# Patient Record
Sex: MALE | Race: WHITE | HISPANIC OR LATINO | Employment: FULL TIME | URBAN - METROPOLITAN AREA
[De-identification: names, ages, dates, MRNs, and addresses within clinical notes are randomized per-mention and may not be internally consistent; named-entity substitution may affect disease eponyms.]

---

## 2022-08-22 ENCOUNTER — HOSPITAL ENCOUNTER (EMERGENCY)
Facility: HOSPITAL | Age: 42
Discharge: HOME | End: 2022-08-22
Attending: EMERGENCY MEDICINE
Payer: COMMERCIAL

## 2022-08-22 ENCOUNTER — APPOINTMENT (OUTPATIENT)
Dept: RADIOLOGY | Facility: HOSPITAL | Age: 42
End: 2022-08-22
Payer: COMMERCIAL

## 2022-08-22 VITALS
HEIGHT: 73 IN | OXYGEN SATURATION: 100 % | BODY MASS INDEX: 35.65 KG/M2 | SYSTOLIC BLOOD PRESSURE: 178 MMHG | RESPIRATION RATE: 18 BRPM | WEIGHT: 269 LBS | TEMPERATURE: 98.1 F | HEART RATE: 87 BPM | DIASTOLIC BLOOD PRESSURE: 88 MMHG

## 2022-08-22 DIAGNOSIS — R53.83 FATIGUE: ICD-10-CM

## 2022-08-22 DIAGNOSIS — R42 LIGHTHEADEDNESS: Primary | ICD-10-CM

## 2022-08-22 DIAGNOSIS — E11.9 DIABETES MELLITUS (HCC): ICD-10-CM

## 2022-08-22 LAB
BASE EXCESS BLDA CALC-SCNC: 4 MMOL/L (ref -2–3)
BASOPHILS # BLD AUTO: 0.06 THOUSANDS/ΜL (ref 0–0.1)
BASOPHILS NFR BLD AUTO: 1 % (ref 0–1)
CA-I BLD-SCNC: 1.17 MMOL/L (ref 1.12–1.32)
CARDIAC TROPONIN I PNL SERPL HS: 3 NG/L
EOSINOPHIL # BLD AUTO: 0.56 THOUSAND/ΜL (ref 0–0.61)
EOSINOPHIL NFR BLD AUTO: 5 % (ref 0–6)
ERYTHROCYTE [DISTWIDTH] IN BLOOD BY AUTOMATED COUNT: 14.4 % (ref 11.6–15.1)
GLUCOSE SERPL-MCNC: 150 MG/DL (ref 65–140)
GLUCOSE SERPL-MCNC: 164 MG/DL (ref 65–140)
HCO3 BLDA-SCNC: 28.9 MMOL/L (ref 24–30)
HCT VFR BLD AUTO: 47.3 % (ref 36.5–49.3)
HCT VFR BLD CALC: 47 % (ref 36.5–49.3)
HGB BLD-MCNC: 16.2 G/DL (ref 12–17)
HGB BLDA-MCNC: 16 G/DL (ref 12–17)
IMM GRANULOCYTES # BLD AUTO: 0.06 THOUSAND/UL (ref 0–0.2)
IMM GRANULOCYTES NFR BLD AUTO: 1 % (ref 0–2)
LYMPHOCYTES # BLD AUTO: 3.6 THOUSANDS/ΜL (ref 0.6–4.47)
LYMPHOCYTES NFR BLD AUTO: 31 % (ref 14–44)
MCH RBC QN AUTO: 28.6 PG (ref 26.8–34.3)
MCHC RBC AUTO-ENTMCNC: 34.2 G/DL (ref 31.4–37.4)
MCV RBC AUTO: 83 FL (ref 82–98)
MONOCYTES # BLD AUTO: 0.69 THOUSAND/ΜL (ref 0.17–1.22)
MONOCYTES NFR BLD AUTO: 6 % (ref 4–12)
NEUTROPHILS # BLD AUTO: 6.6 THOUSANDS/ΜL (ref 1.85–7.62)
NEUTS SEG NFR BLD AUTO: 56 % (ref 43–75)
NRBC BLD AUTO-RTO: 0 /100 WBCS
PCO2 BLD: 30 MMOL/L (ref 21–32)
PCO2 BLD: 45 MM HG (ref 42–50)
PH BLD: 7.42 [PH] (ref 7.3–7.4)
PLATELET # BLD AUTO: 337 THOUSANDS/UL (ref 149–390)
PMV BLD AUTO: 9.5 FL (ref 8.9–12.7)
PO2 BLD: 39 MM HG (ref 35–45)
POTASSIUM BLD-SCNC: 4.6 MMOL/L (ref 3.5–5.3)
RBC # BLD AUTO: 5.67 MILLION/UL (ref 3.88–5.62)
SAO2 % BLD FROM PO2: 73 % (ref 60–85)
SODIUM BLD-SCNC: 138 MMOL/L (ref 136–145)
SPECIMEN SOURCE: ABNORMAL
WBC # BLD AUTO: 11.57 THOUSAND/UL (ref 4.31–10.16)

## 2022-08-22 PROCEDURE — 99285 EMERGENCY DEPT VISIT HI MDM: CPT | Performed by: EMERGENCY MEDICINE

## 2022-08-22 PROCEDURE — 82947 ASSAY GLUCOSE BLOOD QUANT: CPT

## 2022-08-22 PROCEDURE — 84132 ASSAY OF SERUM POTASSIUM: CPT

## 2022-08-22 PROCEDURE — 82330 ASSAY OF CALCIUM: CPT

## 2022-08-22 PROCEDURE — 93005 ELECTROCARDIOGRAM TRACING: CPT

## 2022-08-22 PROCEDURE — 85025 COMPLETE CBC W/AUTO DIFF WBC: CPT | Performed by: EMERGENCY MEDICINE

## 2022-08-22 PROCEDURE — 71046 X-RAY EXAM CHEST 2 VIEWS: CPT

## 2022-08-22 PROCEDURE — 82948 REAGENT STRIP/BLOOD GLUCOSE: CPT

## 2022-08-22 PROCEDURE — 99284 EMERGENCY DEPT VISIT MOD MDM: CPT

## 2022-08-22 PROCEDURE — 83036 HEMOGLOBIN GLYCOSYLATED A1C: CPT | Performed by: EMERGENCY MEDICINE

## 2022-08-22 PROCEDURE — 85014 HEMATOCRIT: CPT

## 2022-08-22 PROCEDURE — 84295 ASSAY OF SERUM SODIUM: CPT

## 2022-08-22 PROCEDURE — 83690 ASSAY OF LIPASE: CPT | Performed by: EMERGENCY MEDICINE

## 2022-08-22 PROCEDURE — 82803 BLOOD GASES ANY COMBINATION: CPT

## 2022-08-22 PROCEDURE — 36415 COLL VENOUS BLD VENIPUNCTURE: CPT

## 2022-08-22 PROCEDURE — 84484 ASSAY OF TROPONIN QUANT: CPT | Performed by: EMERGENCY MEDICINE

## 2022-08-22 PROCEDURE — 80053 COMPREHEN METABOLIC PANEL: CPT | Performed by: EMERGENCY MEDICINE

## 2022-08-22 PROCEDURE — 96360 HYDRATION IV INFUSION INIT: CPT

## 2022-08-22 PROCEDURE — 80061 LIPID PANEL: CPT | Performed by: EMERGENCY MEDICINE

## 2022-08-22 RX ADMIN — SODIUM CHLORIDE 1000 ML: 0.9 INJECTION, SOLUTION INTRAVENOUS at 22:41

## 2022-08-22 NOTE — Clinical Note
Adolfo Mantilla was seen and treated in our emergency department on 8/22/2022  No restrictions            Diagnosis:     Lisa Friend  may return to work on return date  He may return on this date: 08/24/2022         If you have any questions or concerns, please don't hesitate to call        Fracisco Tillman, DO    ______________________________           _______________          _______________  Hospital Representative                              Date                                Time

## 2022-08-23 LAB
ALBUMIN SERPL BCP-MCNC: 4.2 G/DL (ref 3.5–5)
ALP SERPL-CCNC: 104 U/L (ref 46–116)
ALT SERPL W P-5'-P-CCNC: 44 U/L (ref 12–78)
ANION GAP SERPL CALCULATED.3IONS-SCNC: 7 MMOL/L (ref 4–13)
AST SERPL W P-5'-P-CCNC: 29 U/L (ref 5–45)
ATRIAL RATE: 87 BPM
BILIRUB SERPL-MCNC: 0.42 MG/DL (ref 0.2–1)
BUN SERPL-MCNC: 17 MG/DL (ref 5–25)
CALCIUM SERPL-MCNC: 8.4 MG/DL (ref 8.3–10.1)
CHLORIDE SERPL-SCNC: 105 MMOL/L (ref 96–108)
CHOLEST SERPL-MCNC: 250 MG/DL
CO2 SERPL-SCNC: 22 MMOL/L (ref 21–32)
CREAT SERPL-MCNC: 0.92 MG/DL (ref 0.6–1.3)
EST. AVERAGE GLUCOSE BLD GHB EST-MCNC: 174 MG/DL
GFR SERPL CREATININE-BSD FRML MDRD: 103 ML/MIN/1.73SQ M
GLUCOSE SERPL-MCNC: 169 MG/DL (ref 65–140)
HBA1C MFR BLD: 7.7 %
HDLC SERPL-MCNC: 28 MG/DL
LIPASE SERPL-CCNC: 132 U/L (ref 73–393)
NONHDLC SERPL-MCNC: 222 MG/DL
P AXIS: 50 DEGREES
POTASSIUM SERPL-SCNC: 4 MMOL/L (ref 3.5–5.3)
PR INTERVAL: 152 MS
PROT SERPL-MCNC: 8.4 G/DL (ref 6.4–8.4)
QRS AXIS: 14 DEGREES
QRSD INTERVAL: 120 MS
QT INTERVAL: 366 MS
QTC INTERVAL: 440 MS
SODIUM SERPL-SCNC: 134 MMOL/L (ref 135–147)
T WAVE AXIS: 49 DEGREES
TRIGL SERPL-MCNC: 1282 MG/DL
VENTRICULAR RATE: 87 BPM

## 2022-08-23 PROCEDURE — 93010 ELECTROCARDIOGRAM REPORT: CPT | Performed by: INTERNAL MEDICINE

## 2022-08-23 NOTE — ED PROVIDER NOTES
History  Chief Complaint   Patient presents with    Dizziness     Pt is a new diabetic  Pt presents with dizziness starting 8/21  Vomiting yesterday, none today  27-year-old male with recent diagnosis of diabetes though not on any medications presents for evaluation of lightheadedness, fatigue, palpitations since yesterday  Has an appointment with a primary care provider at 11:00  Was told that his hemoglobin A1c was high on during an appointment in March though at that time he saw the [facility] group was out of network for him so he never followed up  On chart review it appears that his hemoglobin A1c was 8 8 in March  Currently denies any specific complaints such as chest pain abdominal pain fevers cough, urinary symptoms  Did have some nausea and 1 episode of nonbilious nonbloody vomitus yesterday  Had some intermittent palpitations and flashes in his vision currently without any complaints  Describes the lightheadedness though no vertigo sensation, no numbness or weakness of the extremities  Reports increased thirst           Prior to Admission Medications   Prescriptions Last Dose Informant Patient Reported? Taking?   naproxen (EC NAPROSYN) 500 MG EC tablet   No No   Sig: Take 1 tablet (500 mg total) by mouth 2 (two) times a day with meals for 7 days      Facility-Administered Medications: None       Past Medical History:   Diagnosis Date    Prediabetes        History reviewed  No pertinent surgical history  History reviewed  No pertinent family history  I have reviewed and agree with the history as documented      E-Cigarette/Vaping    E-Cigarette Use Former User      E-Cigarette/Vaping Substances     Social History     Tobacco Use    Smoking status: Current Every Day Smoker     Packs/day: 1 00    Smokeless tobacco: Never Used   Vaping Use    Vaping Use: Former   Substance Use Topics    Alcohol use: Never    Drug use: Never       Review of Systems   Constitutional: Positive for chills and fatigue  Negative for appetite change and fever  HENT: Negative for rhinorrhea and sore throat  Eyes: Negative for photophobia and visual disturbance  Respiratory: Negative for cough and shortness of breath  Cardiovascular: Positive for palpitations  Negative for chest pain  Gastrointestinal: Negative for abdominal pain and diarrhea  Genitourinary: Negative for dysuria, frequency and urgency  Skin: Negative for rash  Neurological: Positive for light-headedness  Negative for dizziness and weakness  All other systems reviewed and are negative  Physical Exam  Physical Exam  Vitals and nursing note reviewed  Constitutional:       Appearance: He is well-developed  HENT:      Head: Normocephalic and atraumatic  Right Ear: External ear normal       Left Ear: External ear normal       Mouth/Throat:      Mouth: Mucous membranes are moist    Eyes:      Conjunctiva/sclera: Conjunctivae normal       Pupils: Pupils are equal, round, and reactive to light  Neck:      Vascular: No JVD  Trachea: No tracheal deviation  Cardiovascular:      Rate and Rhythm: Normal rate and regular rhythm  Heart sounds: Normal heart sounds  No murmur heard  No friction rub  No gallop  Pulmonary:      Effort: Pulmonary effort is normal  No respiratory distress  Breath sounds: No stridor  No wheezing or rales  Abdominal:      General: There is no distension  Palpations: Abdomen is soft  There is no mass  Tenderness: There is no abdominal tenderness  There is no guarding or rebound  Musculoskeletal:         General: Normal range of motion  Cervical back: Normal range of motion and neck supple  Skin:     General: Skin is warm and dry  Capillary Refill: Capillary refill takes less than 2 seconds  Coloration: Skin is not pale  Findings: No erythema or rash  Neurological:      General: No focal deficit present        Mental Status: He is alert and oriented to person, place, and time  Mental status is at baseline  Cranial Nerves: No cranial nerve deficit  Vital Signs  ED Triage Vitals [08/22/22 2113]   Temperature Pulse Respirations Blood Pressure SpO2   98 1 °F (36 7 °C) 87 18 (!) 178/88 100 %      Temp Source Heart Rate Source Patient Position - Orthostatic VS BP Location FiO2 (%)   Temporal Monitor Sitting Right arm --      Pain Score       No Pain           Vitals:    08/22/22 2113   BP: (!) 178/88   Pulse: 87   Patient Position - Orthostatic VS: Sitting         Visual Acuity      ED Medications  Medications   sodium chloride 0 9 % bolus 1,000 mL (1,000 mL Intravenous New Bag 8/22/22 2241)       Diagnostic Studies  Results Reviewed     Procedure Component Value Units Date/Time    POCT Blood Gas (CG8+) [[id number]]  (Abnormal) Collected: 08/22/22 2238    Lab Status: Final result Specimen: Venous Updated: 08/22/22 2241     ph, Lukasz ISTAT 7 416     pCO2, Lukasz i-STAT 45 0 mm HG      pO2, Lukasz i-STAT 39 0 mm HG      BE, i-STAT 4 mmol/L      HCO3, Lukasz i-STAT 28 9 mmol/L      CO2, i-STAT 30 mmol/L      O2 Sat, i-STAT 73 %      SODIUM, I-STAT 138 mmol/l      Potassium, i-STAT 4 6 mmol/L      Calcium, Ionized i-STAT 1 17 mmol/L      Hct, i-STAT 47 %      Hgb, i-STAT 16 0 g/dl      Glucose, i-STAT 150 mg/dl      Specimen Type VENOUS    Lipid panel [[id number]] Collected: 08/22/22 2116    Lab Status: In process Specimen: Blood from Arm, Right Updated: 08/22/22 2219    Lipase [[id number]] Collected: 08/22/22 2116    Lab Status:  In process Specimen: Blood from Arm, Right Updated: 08/22/22 2219    HS Troponin 0hr (reflex protocol) [[id number]]  (Normal) Collected: 08/22/22 2116    Lab Status: Final result Specimen: Blood from Arm, Right Updated: 08/22/22 2202     hs TnI 0hr 3 ng/L     CBC and differential [[id number]]  (Abnormal) Collected: 08/22/22 2116    Lab Status: Final result Specimen: Blood from Arm, Right Updated: 08/22/22 2129     WBC 11 57 Thousand/uL RBC 5 67 Million/uL      Hemoglobin 16 2 g/dL      Hematocrit 47 3 %      MCV 83 fL      MCH 28 6 pg      MCHC 34 2 g/dL      RDW 14 4 %      MPV 9 5 fL      Platelets 423 Thousands/uL      nRBC 0 /100 WBCs      Neutrophils Relative 56 %      Immat GRANS % 1 %      Lymphocytes Relative 31 %      Monocytes Relative 6 %      Eosinophils Relative 5 %      Basophils Relative 1 %      Neutrophils Absolute 6 60 Thousands/µL      Immature Grans Absolute 0 06 Thousand/uL      Lymphocytes Absolute 3 60 Thousands/µL      Monocytes Absolute 0 69 Thousand/µL      Eosinophils Absolute 0 56 Thousand/µL      Basophils Absolute 0 06 Thousands/µL     Hemoglobin A1C [[id number]] Collected: 08/22/22 2116    Lab Status: In process Specimen: Blood from Arm, Right Updated: 08/22/22 2127    Comprehensive metabolic panel [[id number]] Collected: 08/22/22 2116    Lab Status:  In process Specimen: Blood from Arm, Right Updated: 08/22/22 2127    Fingerstick Glucose (POCT) [[id number]]  (Abnormal) Collected: 08/22/22 2112    Lab Status: Final result Updated: 08/22/22 2122     POC Glucose 164 mg/dl                  XR chest pa & lateral   ED Interpretation by Romina Mantilla DO (08/22 2215)   This study was ordered and independently reviewed by me    No acute findings noted                    Procedures  Procedures         ED Course  ED Course as of 08/22/22 2310   Mon Aug 22, 2022   2305 POTASSIUM,I-STAT: 4 6   2306 Glucose, i-STAT(!): 150   2306 ph, Steven Faith(!): 7 416   2309 Patient does not want to stay for the rest of the lab work to resulted it is being Curriered to [employer] secondary to Lipemia, blood gas with normal potassium, glucose 150, normal pH, troponin negative, currently patient asymptomatic will follow-up with his PCP at 11:00 and the morning                                             MDM  Number of Diagnoses or Management Options  Fatigue  Lightheadedness  Diagnosis management comments: 68-year-old male with new onset diabetes currently not on any medications has appointment with PCP in the morning no specific complaints currently though describes generalized fatigue, intermittent palpitations, lightheadedness currently feels well, his blood is lipemic so the samples for most of his labs were sent to the main laboratory in [facility] for analysis, discussed with patient admission for monitoring as we do not have information on his electrolyte status, kidney function, versus follow-up with PCP in the morning patient does not want to stay in the hospital at this time will attempt to get information from point of care blood gas will re-evaluate after fluids  Current glucose 164, has appointment with PCP in the morning will hold off on any hyperglycemia treatment at this time      Disposition  Final diagnoses:   Lightheadedness   Fatigue   Diabetes mellitus ([facility] [address] )     Time reflects when diagnosis was documented in both MDM as applicable and the Disposition within this note     Time User Action Codes Description Comment    8/22/2022 10:36 PM Ricky Oar Add [R42] Lightheadedness     8/22/2022 10:36 PM Ricky Oar Add [R53 83] Fatigue     8/22/2022 11:06 PM Ricky Oar Add [E11 9] Diabetes mellitus [facility])       ED Disposition     ED Disposition   Discharge    Condition   Stable    Date/Time   Mon Aug 22, 2022 11:06 PM    Comment   Ino Alvarenga discharge to home/self care  Follow-up Information    None         Patient's Medications   Discharge Prescriptions    No medications on file       No discharge procedures on file      PDMP Review     None          ED Provider  Electronically Signed by           Mikhail Nash DO  08/22/22 4319

## 2022-08-23 NOTE — DISCHARGE INSTRUCTIONS
Follow-up with your primary care provider at the appointment at 11:00 in the morning, if symptoms worsen please return to the emergency department

## 2024-05-19 ENCOUNTER — APPOINTMENT (EMERGENCY)
Dept: CT IMAGING | Facility: HOSPITAL | Age: 44
End: 2024-05-19
Payer: COMMERCIAL

## 2024-05-19 ENCOUNTER — HOSPITAL ENCOUNTER (INPATIENT)
Facility: HOSPITAL | Age: 44
LOS: 2 days | Discharge: HOME | DRG: 254 | End: 2024-05-21
Attending: SURGERY | Admitting: SURGERY
Payer: COMMERCIAL

## 2024-05-19 ENCOUNTER — APPOINTMENT (EMERGENCY)
Dept: NON INVASIVE DIAGNOSTICS | Facility: HOSPITAL | Age: 44
End: 2024-05-19
Payer: COMMERCIAL

## 2024-05-19 ENCOUNTER — HOSPITAL ENCOUNTER (EMERGENCY)
Facility: HOSPITAL | Age: 44
End: 2024-05-19
Attending: EMERGENCY MEDICINE | Admitting: EMERGENCY MEDICINE
Payer: COMMERCIAL

## 2024-05-19 VITALS
HEART RATE: 80 BPM | SYSTOLIC BLOOD PRESSURE: 142 MMHG | BODY MASS INDEX: 32.84 KG/M2 | DIASTOLIC BLOOD PRESSURE: 72 MMHG | OXYGEN SATURATION: 95 % | TEMPERATURE: 98.9 F | WEIGHT: 248.9 LBS | RESPIRATION RATE: 18 BRPM

## 2024-05-19 DIAGNOSIS — I70.90 ARTERIAL OCCLUSION: Primary | ICD-10-CM

## 2024-05-19 DIAGNOSIS — I73.9 PAD (PERIPHERAL ARTERY DISEASE) (HCC): Primary | ICD-10-CM

## 2024-05-19 DIAGNOSIS — I70.201 POPLITEAL ARTERY OCCLUSION, RIGHT (HCC): ICD-10-CM

## 2024-05-19 LAB
ALBUMIN SERPL BCP-MCNC: 4.4 G/DL (ref 3.5–5)
ALP SERPL-CCNC: 68 U/L (ref 34–104)
ALT SERPL W P-5'-P-CCNC: 36 U/L (ref 7–52)
ANION GAP SERPL CALCULATED.3IONS-SCNC: 8 MMOL/L (ref 4–13)
APTT PPP: 182 SECONDS (ref 23–37)
APTT PPP: 31 SECONDS (ref 23–37)
AST SERPL W P-5'-P-CCNC: 22 U/L (ref 13–39)
BASOPHILS # BLD AUTO: 0.05 THOUSANDS/ÂΜL (ref 0–0.1)
BASOPHILS NFR BLD AUTO: 1 % (ref 0–1)
BILIRUB SERPL-MCNC: 0.35 MG/DL (ref 0.2–1)
BUN SERPL-MCNC: 14 MG/DL (ref 5–25)
CALCIUM SERPL-MCNC: 9.1 MG/DL (ref 8.4–10.2)
CHLORIDE SERPL-SCNC: 105 MMOL/L (ref 96–108)
CK SERPL-CCNC: 147 U/L (ref 39–308)
CO2 SERPL-SCNC: 25 MMOL/L (ref 21–32)
CREAT SERPL-MCNC: 0.79 MG/DL (ref 0.6–1.3)
D DIMER PPP FEU-MCNC: 0.3 UG/ML FEU
EOSINOPHIL # BLD AUTO: 0.55 THOUSAND/ÂΜL (ref 0–0.61)
EOSINOPHIL NFR BLD AUTO: 5 % (ref 0–6)
ERYTHROCYTE [DISTWIDTH] IN BLOOD BY AUTOMATED COUNT: 13.6 % (ref 11.6–15.1)
ERYTHROCYTE [DISTWIDTH] IN BLOOD BY AUTOMATED COUNT: 14 % (ref 11.6–15.1)
GFR SERPL CREATININE-BSD FRML MDRD: 110 ML/MIN/1.73SQ M
GLUCOSE SERPL-MCNC: 114 MG/DL (ref 65–140)
HCT VFR BLD AUTO: 44.8 % (ref 36.5–49.3)
HCT VFR BLD AUTO: 45.5 % (ref 36.5–49.3)
HGB BLD-MCNC: 14.7 G/DL (ref 12–17)
HGB BLD-MCNC: 15 G/DL (ref 12–17)
IMM GRANULOCYTES # BLD AUTO: 0.03 THOUSAND/UL (ref 0–0.2)
IMM GRANULOCYTES NFR BLD AUTO: 0 % (ref 0–2)
INR PPP: 0.98 (ref 0.84–1.19)
INR PPP: 1.07 (ref 0.84–1.19)
LYMPHOCYTES # BLD AUTO: 3.2 THOUSANDS/ÂΜL (ref 0.6–4.47)
LYMPHOCYTES NFR BLD AUTO: 32 % (ref 14–44)
MAGNESIUM SERPL-MCNC: 2 MG/DL (ref 1.9–2.7)
MCH RBC QN AUTO: 29 PG (ref 26.8–34.3)
MCH RBC QN AUTO: 29.1 PG (ref 26.8–34.3)
MCHC RBC AUTO-ENTMCNC: 32.8 G/DL (ref 31.4–37.4)
MCHC RBC AUTO-ENTMCNC: 33 G/DL (ref 31.4–37.4)
MCV RBC AUTO: 88 FL (ref 82–98)
MCV RBC AUTO: 89 FL (ref 82–98)
MONOCYTES # BLD AUTO: 0.63 THOUSAND/ÂΜL (ref 0.17–1.22)
MONOCYTES NFR BLD AUTO: 6 % (ref 4–12)
NEUTROPHILS # BLD AUTO: 5.69 THOUSANDS/ÂΜL (ref 1.85–7.62)
NEUTS SEG NFR BLD AUTO: 56 % (ref 43–75)
NRBC BLD AUTO-RTO: 0 /100 WBCS
PLATELET # BLD AUTO: 252 THOUSANDS/UL (ref 149–390)
PLATELET # BLD AUTO: 268 THOUSANDS/UL (ref 149–390)
PMV BLD AUTO: 8.9 FL (ref 8.9–12.7)
PMV BLD AUTO: 9.1 FL (ref 8.9–12.7)
POTASSIUM SERPL-SCNC: 3.8 MMOL/L (ref 3.5–5.3)
PROT SERPL-MCNC: 7.5 G/DL (ref 6.4–8.4)
PROTHROMBIN TIME: 13.4 SECONDS (ref 11.6–14.5)
PROTHROMBIN TIME: 13.8 SECONDS (ref 11.6–14.5)
RBC # BLD AUTO: 5.05 MILLION/UL (ref 3.88–5.62)
RBC # BLD AUTO: 5.17 MILLION/UL (ref 3.88–5.62)
SODIUM SERPL-SCNC: 138 MMOL/L (ref 135–147)
WBC # BLD AUTO: 10.15 THOUSAND/UL (ref 4.31–10.16)
WBC # BLD AUTO: 10.38 THOUSAND/UL (ref 4.31–10.16)

## 2024-05-19 PROCEDURE — 80053 COMPREHEN METABOLIC PANEL: CPT | Performed by: EMERGENCY MEDICINE

## 2024-05-19 PROCEDURE — 93926 LOWER EXTREMITY STUDY: CPT

## 2024-05-19 PROCEDURE — 85610 PROTHROMBIN TIME: CPT | Performed by: EMERGENCY MEDICINE

## 2024-05-19 PROCEDURE — 85027 COMPLETE CBC AUTOMATED: CPT | Performed by: PHYSICIAN ASSISTANT

## 2024-05-19 PROCEDURE — 93926 LOWER EXTREMITY STUDY: CPT | Performed by: SURGERY

## 2024-05-19 PROCEDURE — 96375 TX/PRO/DX INJ NEW DRUG ADDON: CPT

## 2024-05-19 PROCEDURE — 36415 COLL VENOUS BLD VENIPUNCTURE: CPT

## 2024-05-19 PROCEDURE — 99285 EMERGENCY DEPT VISIT HI MDM: CPT

## 2024-05-19 PROCEDURE — 85730 THROMBOPLASTIN TIME PARTIAL: CPT | Performed by: EMERGENCY MEDICINE

## 2024-05-19 PROCEDURE — 93923 UPR/LXTR ART STDY 3+ LVLS: CPT | Performed by: SURGERY

## 2024-05-19 PROCEDURE — 85379 FIBRIN DEGRADATION QUANT: CPT | Performed by: EMERGENCY MEDICINE

## 2024-05-19 PROCEDURE — 85730 THROMBOPLASTIN TIME PARTIAL: CPT | Performed by: PHYSICIAN ASSISTANT

## 2024-05-19 PROCEDURE — 85025 COMPLETE CBC W/AUTO DIFF WBC: CPT | Performed by: EMERGENCY MEDICINE

## 2024-05-19 PROCEDURE — 99223 1ST HOSP IP/OBS HIGH 75: CPT | Performed by: SURGERY

## 2024-05-19 PROCEDURE — 85610 PROTHROMBIN TIME: CPT | Performed by: PHYSICIAN ASSISTANT

## 2024-05-19 PROCEDURE — 96365 THER/PROPH/DIAG IV INF INIT: CPT

## 2024-05-19 PROCEDURE — 99407 BEHAV CHNG SMOKING > 10 MIN: CPT | Performed by: SURGERY

## 2024-05-19 PROCEDURE — 75635 CT ANGIO ABDOMINAL ARTERIES: CPT

## 2024-05-19 PROCEDURE — NC001 PR NO CHARGE: Performed by: PHYSICIAN ASSISTANT

## 2024-05-19 PROCEDURE — 99285 EMERGENCY DEPT VISIT HI MDM: CPT | Performed by: EMERGENCY MEDICINE

## 2024-05-19 PROCEDURE — 83735 ASSAY OF MAGNESIUM: CPT | Performed by: EMERGENCY MEDICINE

## 2024-05-19 PROCEDURE — 96366 THER/PROPH/DIAG IV INF ADDON: CPT

## 2024-05-19 PROCEDURE — NC001 PR NO CHARGE: Performed by: RADIOLOGY

## 2024-05-19 PROCEDURE — 82550 ASSAY OF CK (CPK): CPT | Performed by: EMERGENCY MEDICINE

## 2024-05-19 RX ORDER — HEPARIN SODIUM 10000 [USP'U]/100ML
3-30 INJECTION, SOLUTION INTRAVENOUS
Status: DISCONTINUED | OUTPATIENT
Start: 2024-05-19 | End: 2024-05-20

## 2024-05-19 RX ORDER — OXYCODONE HYDROCHLORIDE 5 MG/1
5 TABLET ORAL EVERY 4 HOURS PRN
Status: DISCONTINUED | OUTPATIENT
Start: 2024-05-19 | End: 2024-05-21 | Stop reason: HOSPADM

## 2024-05-19 RX ORDER — OXYCODONE HYDROCHLORIDE 10 MG/1
10 TABLET ORAL EVERY 4 HOURS PRN
Status: DISCONTINUED | OUTPATIENT
Start: 2024-05-19 | End: 2024-05-21 | Stop reason: HOSPADM

## 2024-05-19 RX ORDER — HEPARIN SODIUM 1000 [USP'U]/ML
8800 INJECTION, SOLUTION INTRAVENOUS; SUBCUTANEOUS ONCE
Status: COMPLETED | OUTPATIENT
Start: 2024-05-19 | End: 2024-05-19

## 2024-05-19 RX ORDER — HYDROMORPHONE HCL/PF 1 MG/ML
0.5 SYRINGE (ML) INJECTION EVERY 2 HOUR PRN
Status: DISCONTINUED | OUTPATIENT
Start: 2024-05-19 | End: 2024-05-21

## 2024-05-19 RX ORDER — SODIUM CHLORIDE 9 MG/ML
125 INJECTION, SOLUTION INTRAVENOUS CONTINUOUS
Status: DISCONTINUED | OUTPATIENT
Start: 2024-05-20 | End: 2024-05-20

## 2024-05-19 RX ORDER — NICOTINE 21 MG/24HR
1 PATCH, TRANSDERMAL 24 HOURS TRANSDERMAL DAILY
Status: DISCONTINUED | OUTPATIENT
Start: 2024-05-20 | End: 2024-05-21 | Stop reason: HOSPADM

## 2024-05-19 RX ORDER — SEMAGLUTIDE 2.68 MG/ML
2 INJECTION, SOLUTION SUBCUTANEOUS
COMMUNITY
Start: 2024-05-07

## 2024-05-19 RX ORDER — HEPARIN SODIUM 10000 [USP'U]/100ML
3-30 INJECTION, SOLUTION INTRAVENOUS
Status: DISCONTINUED | OUTPATIENT
Start: 2024-05-19 | End: 2024-05-19 | Stop reason: HOSPADM

## 2024-05-19 RX ORDER — TIZANIDINE HYDROCHLORIDE 4 MG/1
4 CAPSULE, GELATIN COATED ORAL 3 TIMES DAILY
COMMUNITY

## 2024-05-19 RX ORDER — ACETAMINOPHEN 325 MG/1
650 TABLET ORAL EVERY 4 HOURS PRN
Status: DISCONTINUED | OUTPATIENT
Start: 2024-05-19 | End: 2024-05-21 | Stop reason: HOSPADM

## 2024-05-19 RX ORDER — HYDROMORPHONE HCL/PF 1 MG/ML
0.5 SYRINGE (ML) INJECTION
Status: DISCONTINUED | OUTPATIENT
Start: 2024-05-19 | End: 2024-05-19 | Stop reason: HOSPADM

## 2024-05-19 RX ORDER — HEPARIN SODIUM 1000 [USP'U]/ML
4400 INJECTION, SOLUTION INTRAVENOUS; SUBCUTANEOUS EVERY 6 HOURS PRN
Status: DISCONTINUED | OUTPATIENT
Start: 2024-05-19 | End: 2024-05-20

## 2024-05-19 RX ORDER — HEPARIN SODIUM 1000 [USP'U]/ML
4400 INJECTION, SOLUTION INTRAVENOUS; SUBCUTANEOUS EVERY 6 HOURS PRN
Status: DISCONTINUED | OUTPATIENT
Start: 2024-05-19 | End: 2024-05-19 | Stop reason: HOSPADM

## 2024-05-19 RX ORDER — ARMODAFINIL 250 MG/1
250 TABLET ORAL DAILY PRN
COMMUNITY
Start: 2024-05-10

## 2024-05-19 RX ORDER — HEPARIN SODIUM 1000 [USP'U]/ML
8800 INJECTION, SOLUTION INTRAVENOUS; SUBCUTANEOUS EVERY 6 HOURS PRN
Status: DISCONTINUED | OUTPATIENT
Start: 2024-05-19 | End: 2024-05-20

## 2024-05-19 RX ORDER — HEPARIN SODIUM 1000 [USP'U]/ML
8800 INJECTION, SOLUTION INTRAVENOUS; SUBCUTANEOUS EVERY 6 HOURS PRN
Status: DISCONTINUED | OUTPATIENT
Start: 2024-05-19 | End: 2024-05-19 | Stop reason: HOSPADM

## 2024-05-19 RX ORDER — ONDANSETRON 2 MG/ML
4 INJECTION INTRAMUSCULAR; INTRAVENOUS EVERY 4 HOURS PRN
Status: DISCONTINUED | OUTPATIENT
Start: 2024-05-19 | End: 2024-05-21 | Stop reason: HOSPADM

## 2024-05-19 RX ADMIN — HEPARIN SODIUM 8800 UNITS: 1000 INJECTION INTRAVENOUS; SUBCUTANEOUS at 16:38

## 2024-05-19 RX ADMIN — HEPARIN SODIUM 18 UNITS/KG/HR: 10000 INJECTION, SOLUTION INTRAVENOUS at 16:38

## 2024-05-19 RX ADMIN — HEPARIN SODIUM 18 UNITS/KG/HR: 10000 INJECTION, SOLUTION INTRAVENOUS at 20:37

## 2024-05-19 RX ADMIN — HYDROMORPHONE HYDROCHLORIDE 0.5 MG: 1 INJECTION, SOLUTION INTRAMUSCULAR; INTRAVENOUS; SUBCUTANEOUS at 18:45

## 2024-05-19 RX ADMIN — IOHEXOL 120 ML: 350 INJECTION, SOLUTION INTRAVENOUS at 16:44

## 2024-05-19 NOTE — H&P
H&P - Vascular Surgery   Taj Mendes 44 y.o. male MRN: [id number]  Unit/Bed#: [facility] 523-01 Encounter: [id number]        Assessment/ Plan:    Admit to vascular, Dr. Lemus, Heparin gtt, pain medicine, IR consult, NV checks, compartment checks, NPO after MN, tobacco cessation, cardiology consult, EKG, echo    Tobacco use is a significant patient-modifiable risk factor for this patient’s vascular disease with multiple vascular comorbidities, and a significant risk factor for failure of and complications from any endovascular or surgical interventions.    I explained to the patient the effects of smoking including peripheral artery disease, coronary artery disease, cerebrovascular disease as well as cancer and chronic obstructive pulmonary disease. I asked the patient to stop smoking immediately. It is never too late to quit, and many studies show significant health benefits as well as economical savings after smoking cessation. I offered to the patient nicotine replacement therapy as well as referral to the smoking cessation program and access to the quit line 2-053-YOTUJOU or ambulatory referral to our network smoking cessation program.    Based on our conversation, this patient does not appear ready to quit    And declined my offer of nicotine replacement or tobacco cessation medications    The patient did not set a quit date. I will continue to  follow up on this issue at our next scheduled visit.     I spent approximately 10 minutes on tobacco cessation counseling with this patient.           ____________________________________________________________________  Chief Complaint: RLE extremity pain x 2 weeks    HPI: Taj Mendes is a 44 y.o. male with PMHXxof DM2 and tobacco abuse who presented to the ED today with c/o a 2-week h/o progressive RLE pain starting in his right calf.  Pain is worse with walking and cramping in nature mostly in posterior calf.  Today patient noticed increased pain at rest  with numbness and some burning over his right forefoot prompting him to come to ED for evaluation.  He denies any previous h/o similar symptoms although does note occasional cramping in left calf.  He states his BS have been well controlled on Ozempic with continuous glucose monitoring.  Patient denies any known h/o vascular or cardiac disease.  No h/o LE wounds or skin color changes. He denies any CP, SOB, GOMES, no fevers or chills, no cough, abdominal pain. He does note a FHx of DM and CAD in his mother.     Patient has 30  year pack history for cigarettes.  No other pertinent PMH.    Review of Systems:  General: negative for - chills, fatigue, or fever  Cardiovascular: no chest pain or dyspnea on exertion  Respiratory: no cough, shortness of breath, or wheezing  Gastrointestinal: no abdominal pain, change in bowel habits, or black or bloody stools  Genitourinary: no dysuria, trouble voiding, or hematuria  Musculoskeletal: positive for - right leg pain  Neurological: no TIA or stroke symptoms  Hematological and Lymphatic: negative  Dermatological: negative  Psychological: negative  Ophthalmic: negative  ENT: negative    Past Medical History:  Past Medical History:   Diagnosis Date    Prediabetes        Past Surgical History:  No past surgical history on file.    Social History:  Social History     Substance and Sexual Activity   Alcohol Use Never     Social History     Substance and Sexual Activity   Drug Use Never     Social History     Tobacco Use   Smoking Status Every Day    Current packs/day: 1.00    Types: Cigarettes   Smokeless Tobacco Never       Family History:  No family history on file.    Allergies:  Allergies   Allergen Reactions    Shellfish-Derived Products - Food Allergy Itching       Medications:  Home meds:   Prior to Admission medications    Medication Sig Start Date End Date Taking? Authorizing Provider   Armodafinil 250 MG tablet Take 250 mg by mouth daily as needed In the morning 5/10/24  Yes  "Historical Provider, MD   TiZANidine (ZANAFLEX) 4 MG capsule Take 4 mg by mouth 3 (three) times a day   Yes Historical Provider, MD   naproxen (EC NAPROSYN) 500 MG EC tablet Take 1 tablet (500 mg total) by mouth 2 (two) times a day with meals for 7 days 3/30/21 4/6/21  Madeleine Ortiz MD         Vitals:  Ht 6' 2\" (1.88 m)   Wt 113 kg (248 lb 10.9 oz)   BMI 31.93 kg/m²   Body mass index is 31.93 kg/m².  Weight (last 2 days)       Date/Time Weight    05/19/24 1900 113 (248.68)            I/Os:  No intake or output data in the 24 hours ending 05/19/24 1947    Physical Exam:    General appearance: alert, appears stated age, and cooperative  Head: Normocephalic, without obvious abnormality, atraumatic  Eyes:  EOMI  Lungs: No resp distress  Chest wall: no tenderness  Heart: No edema  Abdomen: soft, NT  Genitalia: deferred  Rectal: deferred  Extremities: extremities normal, atraumatic, no cyanosis or edema  Skin: Skin color, texture, turgor normal. No rashes or lesions  Neurologic: Grossly normal, motor and sensation intact    Pulse exam:  Femoral: Right: 2+                   Left: 2+  DP: Right:  no signals           Left: 2+  PT: Right:  no signals         Left: 2+      Lab Results and Cultures:   COVID:   Last COVID19 Screening Values       None           CBC with diff:   Lab Results   Component Value Date    WBC 10.15 05/19/2024    HGB 15.0 05/19/2024    HCT 45.5 05/19/2024    MCV 88 05/19/2024     05/19/2024    RBC 5.17 05/19/2024    MCH 29.0 05/19/2024    MCHC 33.0 05/19/2024    RDW 13.6 05/19/2024    MPV 9.1 05/19/2024    NRBC 0 05/19/2024      BMP/CMP:  Lab Results   Component Value Date    K 3.8 05/19/2024    K 4.0 03/03/2022     05/19/2024     03/03/2022    CO2 25 05/19/2024    CO2 30 08/22/2022    CO2 29 03/03/2022    BUN 14 05/19/2024    BUN 16 03/03/2022    CREATININE 0.79 05/19/2024    CREATININE 0.80 03/03/2022    GLUCOSE 150 (H) 08/22/2022    CALCIUM 9.1 05/19/2024    CALCIUM 9.8 " "03/03/2022    AST 22 05/19/2024    AST 12 03/03/2022    ALT 36 05/19/2024    ALT 28 03/03/2022    ALKPHOS 68 05/19/2024    ALKPHOS 106 03/03/2022    EGFR 110 05/19/2024   ,     Coags:   Lab Results   Component Value Date    PTT 31 05/19/2024    INR 0.98 05/19/2024   ,   Results from last 7 days   Lab Units 05/19/24  1623   PTT seconds 31   INR  0.98        Lipid Panel: No results found for: \"CHOL\"  Lab Results   Component Value Date    HDL 28 (L) 08/22/2022     Lab Results   Component Value Date    LDLCALC  08/22/2022      Comment:      Calculated LDL invalid, triglycerides >400 mg/dl  This screening LDL is a calculated result.   It does not have the accuracy of the Direct Measured LDL in the monitoring of patients with hyperlipidemia and/or statin therapy.   Direct Measure LDL (OQU999) must be ordered separately in these patients.     Lab Results   Component Value Date    TRIG 1,282 (H) 08/22/2022       HgbA1c:   Lab Results   Component Value Date    HGBA1C 7.7 (H) 08/22/2022    HGBA1C 8.8 (H) 03/03/2022       Blood Culture: No results found for: \"BLOODCX\",   Urinalysis: No results found for: \"COLORU\", \"CLARITYU\", \"SPECGRAV\", \"PHUR\", \"LEUKOCYTESUR\", \"NITRITE\", \"PROTEINUA\", \"GLUCOSEU\", \"KETONESU\", \"BILIRUBINUR\", \"BLOODU\",   Urine Culture: No results found for: \"URINECX\",   Wound Culure:  No results found for: \"WOUNDCULT\"    Imaging:  CTA 5/19: VESSELS:     Abdominal aorta is moderately atherosclerotic with irregular predominantly noncalcified plaques. No significant stenosis identified. Celiac artery is patent. SMA is patent. SARHA is patent. Bilateral renal arteries are patent without significant stenosis.     Bilateral common iliac arteries are mildly atherosclerotic without significant stenosis; there is a small web or a tiny dissection flap at the proximal left common iliac artery without causing significant stenosis (series 2 image #118). There is   high-grade stenosis at the origin of the left internal iliac " artery. The right internal iliac artery is patent without significant stenosis. Bilateral external iliac arteries are patent without significant stenosis.     Right lower extremity: CFA is patent without significant stenosis. Deep moderate focal stenosis (approximately 50%) at the proximal SFA (series 2, image #227). There is a 6 cm segment of occlusion at the P1 segment. The remaining popliteal artery is   reconstituted but diffusely diseased. Patent three-vessel runoff. High-grade stenosis at the origin of the peroneal artery. Dorsalis pedis and the plantar arteries are patent.     Left lower extremity: CFA is patent without significant stenosis. Deep femoral artery is patent without significant stenosis. Mild irregularities along the SFA without significant stenosis. Popliteal artery is mildly diseased without significant   stenosis. Patent three-vessel runoff. Patent dorsalis pedis, medial and lateral plantar arteries.    EKG, Pathology, and Other Studies: I have personally reviewed pertinent reports.    VTE Prophylaxis: Sequential compression device (Venodyne)      Code Status: Level 1 - Full Code  Advance Directive and Living Will:      Power of :    POLST:      Meghan Banegas PA-C  5/19/2024

## 2024-05-19 NOTE — EMTALA/ACUTE CARE TRANSFER
[facility] EMERGENCY DEPARTMENT  [address]  [place] [address]  Dept: [phone]      EMTALA TRANSFER CONSENT    NAME Taj Mendes                                         1980                              MRN [id number]    I have been informed of my rights regarding examination, treatment, and transfer   by Dr. Hank Pagan DO    Benefits: Specialized equipment and/or services available at the receiving facility (Include comment)________________________, Other benefits (Include comment)_______________________ (Vascular surgery service)    Risks: Potential for delay in receiving treatment, Potential deterioration of medical condition, Loss of IV, Increased discomfort during transfer, Possible worsening of condition or death during transfer      Consent for Transfer:  I acknowledge that my medical condition has been evaluated and explained to me by the emergency department physician or other qualified medical person and/or my attending physician, who has recommended that I be transferred to the service of  Accepting Physician: Allan at Accepting Facility Name, City & State : [facility]. The above potential benefits of such transfer, the potential risks associated with such transfer, and the probable risks of not being transferred have been explained to me, and I fully understand them.  The doctor has explained that, in my case, the benefits of transfer outweigh the risks.  I agree to be transferred.    I authorize the performance of emergency medical procedures and treatments upon me in both transit and upon arrival at the receiving facility.  Additionally, I authorize the release of any and all medical records to the receiving facility and request they be transported with me, if possible.  I understand that the safest mode of transportation during a medical emergency is an ambulance and that the Hospital advocates the use of this mode of transport. Risks of  traveling to the receiving facility by car, including absence of medical control, life sustaining equipment, such as oxygen, and medical personnel has been explained to me and I fully understand them.    (PATEL CORRECT BOX BELOW)  [X]  I consent to the stated transfer and to be transported by ambulance/helicopter.  [  ]  I consent to the stated transfer, but refuse transportation by ambulance and accept full responsibility for my transportation by car.  I understand the risks of non-ambulance transfers and I exonerate the Hospital and its staff from any deterioration in my condition that results from this refusal.    X___________________________________________    DATE  24  TIME________  Signature of patient or legally responsible individual signing on patient behalf           RELATIONSHIP TO PATIENT_________________________          Provider Certification    NAME Taj Mendes                                        [facility] 1980                              MRN [id number]    A medical screening exam was performed on the above named patient.  Based on the examination:    Condition Necessitating Transfer The encounter diagnosis was PAD (peripheral artery disease) (HCC).    Patient Condition:      Reason for Transfer: Level of Care needed not available at this facility    Transfer Requirements: Facility [facility]   Space available and qualified personnel available for treatment as acknowledged by Erica Bell  Agreed to accept transfer and to provide appropriate medical treatment as acknowledged by       Allan  Appropriate medical records of the examination and treatment of the patient are provided at the time of transfer   STAFF INITIAL WHEN COMPLETED _______  Transfer will be performed by qualified personnel from [facility]  and appropriate transfer equipment as required, including the use of necessary and appropriate life support measures.    Provider Certification: I have examined the patient and explained the  following risks and benefits of being transferred/refusing transfer to the patient/family:  General risk, such as traffic hazards, adverse weather conditions, rough terrain or turbulence, possible failure of equipment (including vehicle or aircraft), or consequences of actions of persons outside the control of the transport personnel, Unanticipated needs of medical equipment and personnel during transport, Risk of worsening condition      Based on these reasonable risks and benefits to the patient and/or the unborn child(faye), and based upon the information available at the time of the patient’s examination, I certify that the medical benefits reasonably to be expected from the provision of appropriate medical treatments at another medical facility outweigh the increasing risks, if any, to the individual’s medical condition, and in the case of labor to the unborn child, from effecting the transfer.    X____________________________________________ DATE 05/19/24        TIME_______      ORIGINAL - SEND TO MEDICAL RECORDS   COPY - SEND WITH PATIENT DURING TRANSFER

## 2024-05-19 NOTE — QUICK NOTE
Vascular follow-up    CTA reviewed with vascular fellow.  Occlusion of SFA/popliteal.  Patient will require transfer to [facility].    Continue heparin drip  Continue n.p.o.    Plan to discuss with IR for possible arteriogram/intervention this evening.  Patient made aware of findings and transfer plan.  All questions answered.    Discussed with ED attending.  Patient to be transferred to vascular service, Dr. Allan Bailey

## 2024-05-19 NOTE — ED PROVIDER NOTES
"History  Chief Complaint   Patient presents with    Leg Pain     Pt reports leg cramping for about a week. Yesterday it \"felt like something snapped and felt like my leg went numb\" Denies swelling, redness, or injury. Denies any vision or speech complications. Denies cp or sob.      45-year-old male with history of diabetes on Ozempic and tobacco use disorder states he has had constant pain in the right calf for 2 weeks.  Yesterday while ambulating into a store he suddenly had severe burning, stinging pain down the anterior aspect of the right lower leg into the dorsum of the foot.  This is continuous.  It is somewhat better when leg is dependent, worse when it is horizontal.  It was difficult for him to sleep last night due to this pain.  Patient has known sciatica in the left thigh but never had sciatica on the right leg.  There has been no recent injury or illness, rash, change in bowel or bladder control, abdominal pain, unexplained weight loss.  He states the foot feels cold and numb.  The right foot is a bit colder than the left.  Unable to palpate posterior tibial or dorsalis pedis pulse        Prior to Admission Medications   Prescriptions Last Dose Informant Patient Reported? Taking?   Armodafinil 250 MG tablet   Yes Yes   Sig: Take 250 mg by mouth daily as needed In the morning   TiZANidine (ZANAFLEX) 4 MG capsule   Yes Yes   Sig: Take 4 mg by mouth 3 (three) times a day   naproxen (EC NAPROSYN) 500 MG EC tablet   No No   Sig: Take 1 tablet (500 mg total) by mouth 2 (two) times a day with meals for 7 days      Facility-Administered Medications: None       Past Medical History:   Diagnosis Date    Prediabetes        Past Surgical History:   Procedure Laterality Date    IR LOWER EXTREMITY ANGIOGRAM  5/20/2024       History reviewed. No pertinent family history.  I have reviewed and agree with the history as documented.    E-Cigarette/Vaping    E-Cigarette Use Former User      E-Cigarette/Vaping Substances "     Social History     Tobacco Use    Smoking status: Every Day     Current packs/day: 1.00     Types: Cigarettes    Smokeless tobacco: Never   Vaping Use    Vaping status: Former   Substance Use Topics    Alcohol use: Never    Drug use: Never       Review of Systems   Constitutional:  Negative for fever.   Respiratory:  Negative for shortness of breath.    Cardiovascular:  Negative for palpitations and leg swelling.   Gastrointestinal:  Negative for abdominal pain, diarrhea and vomiting.       Physical Exam  Physical Exam  Vitals and nursing note reviewed.   Constitutional:       General: He is in acute distress.      Appearance: He is well-developed and normal weight. He is not ill-appearing or diaphoretic.   HENT:      Head: Normocephalic and atraumatic.      Right Ear: External ear normal.      Left Ear: External ear normal.   Eyes:      General: No scleral icterus.     Conjunctiva/sclera: Conjunctivae normal.   Neck:      Vascular: No JVD.   Cardiovascular:      Rate and Rhythm: Normal rate and regular rhythm.      Heart sounds: Normal heart sounds.      Comments: Unable to palpate right-sided dorsalis pedis nor posterior tibial pulses.  They are present on the left  Pulmonary:      Effort: Pulmonary effort is normal. No respiratory distress.      Breath sounds: Normal breath sounds.   Abdominal:      Palpations: Abdomen is soft. There is no mass.      Tenderness: There is no abdominal tenderness.   Musculoskeletal:         General: No tenderness. Normal range of motion.      Cervical back: Neck supple.   Skin:     General: Skin is warm and dry.      Capillary Refill: Capillary refill takes less than 2 seconds.      Coloration: Skin is not pale.      Findings: No bruising, erythema or rash.   Neurological:      General: No focal deficit present.      Mental Status: He is alert and oriented to person, place, and time. Mental status is at baseline.      Cranial Nerves: No cranial nerve deficit.      Sensory: No  sensory deficit.      Motor: No weakness.      Coordination: Coordination normal.      Gait: Gait normal.      Deep Tendon Reflexes: Reflexes are normal and symmetric.   Psychiatric:         Mood and Affect: Mood normal.         Behavior: Behavior normal.         Vital Signs  ED Triage Vitals [05/19/24 1155]   Temperature Pulse Respirations Blood Pressure SpO2   98.9 °F (37.2 °C) 89 20 (!) 183/103 98 %      Temp Source Heart Rate Source Patient Position - Orthostatic VS BP Location FiO2 (%)   Temporal Monitor Sitting Right arm --      Pain Score       7           Vitals:    05/19/24 1512 05/19/24 1706 05/19/24 1730 05/19/24 1830   BP: 136/79 147/73 157/70 142/72   Pulse: 82 80 78 80   Patient Position - Orthostatic VS: Sitting Lying  Lying         Visual Acuity      ED Medications  Medications   heparin (porcine) injection 8,800 Units (8,800 Units Intravenous Given 5/19/24 1638)   iohexol (OMNIPAQUE) 350 MG/ML injection (SINGLE-DOSE) 100 mL (120 mL Intravenous Given 5/19/24 1644)       Diagnostic Studies  Results Reviewed       Procedure Component Value Units Date/Time    Protime-INR [[id number]]  (Normal) Collected: 05/19/24 1623    Lab Status: Final result Specimen: Blood from Arm, Right Updated: 05/19/24 1641     Protime 13.4 seconds      INR 0.98    APTT [[id number]]  (Normal) Collected: 05/19/24 1623    Lab Status: Final result Specimen: Blood from Arm, Right Updated: 05/19/24 1641     PTT 31 seconds     Magnesium [[id number]]  (Normal) Collected: 05/19/24 1206    Lab Status: Final result Specimen: Blood from Arm, Right Updated: 05/19/24 1341     Magnesium 2.0 mg/dL     CK [[id number]]  (Normal) Collected: 05/19/24 1206    Lab Status: Final result Specimen: Blood from Arm, Right Updated: 05/19/24 1341     Total  U/L     D-dimer, quantitative [[id number]]  (Normal) Collected: 05/19/24 1206    Lab Status: Final result Specimen: Blood from Arm, Right Updated: 05/19/24 1227     D-Dimer, Quant 0.30 ug/ml FEU      Comprehensive metabolic panel [[id number]] Collected: 05/19/24 1206    Lab Status: Final result Specimen: Blood from Arm, Right Updated: 05/19/24 1226     Sodium 138 mmol/L      Potassium 3.8 mmol/L      Chloride 105 mmol/L      CO2 25 mmol/L      ANION GAP 8 mmol/L      BUN 14 mg/dL      Creatinine 0.79 mg/dL      Glucose 114 mg/dL      Calcium 9.1 mg/dL      AST 22 U/L      ALT 36 U/L      Alkaline Phosphatase 68 U/L      Total Protein 7.5 g/dL      Albumin 4.4 g/dL      Total Bilirubin 0.35 mg/dL      eGFR 110 ml/min/1.73sq m     Narrative:      National Kidney Disease Foundation guidelines for Chronic Kidney Disease (CKD):     Stage 1 with normal or high GFR (GFR > 90 mL/min/1.73 square meters)    Stage 2 Mild CKD (GFR = 60-89 mL/min/1.73 square meters)    Stage 3A Moderate CKD (GFR = 45-59 mL/min/1.73 square meters)    Stage 3B Moderate CKD (GFR = 30-44 mL/min/1.73 square meters)    Stage 4 Severe CKD (GFR = 15-29 mL/min/1.73 square meters)    Stage 5 End Stage CKD (GFR <15 mL/min/1.73 square meters)  Note: GFR calculation is accurate only with a steady state creatinine    CBC and differential [[id number]] Collected: 05/19/24 1206    Lab Status: Final result Specimen: Blood from Arm, Right Updated: 05/19/24 1213     WBC 10.15 Thousand/uL      RBC 5.17 Million/uL      Hemoglobin 15.0 g/dL      Hematocrit 45.5 %      MCV 88 fL      MCH 29.0 pg      MCHC 33.0 g/dL      RDW 13.6 %      MPV 9.1 fL      Platelets 268 Thousands/uL      nRBC 0 /100 WBCs      Segmented % 56 %      Immature Grans % 0 %      Lymphocytes % 32 %      Monocytes % 6 %      Eosinophils Relative 5 %      Basophils Relative 1 %      Absolute Neutrophils 5.69 Thousands/µL      Absolute Immature Grans 0.03 Thousand/uL      Absolute Lymphocytes 3.20 Thousands/µL      Absolute Monocytes 0.63 Thousand/µL      Eosinophils Absolute 0.55 Thousand/µL      Basophils Absolute 0.05 Thousands/µL                    CTA abdominal w run off w wo contrast   Final  Result by Bertha Salamanca MD (05/19 1721)      1.  Segmental occlusion (6 cm in length) of the above-knee right popliteal artery. Differential considerations include thromboembolism and in situ thrombosis.   2.  Left nephrolithiasis.   3.  Additional findings as described above.      The study was marked in EPIC for immediate notification.      Workstation performed: [id number]         VAS ARTERIAL DUPLEX-LOWER LIMB UNILATERAL   Final Result by Yehuda Patino MD (05/20 1336)                 Procedures  Procedures         ED Course  ED Course as of 05/20/24 1923   Sun May 19, 2024   1457 Vascular tech states the study shows a distal SFA occlusion with minimal distal reconstitution.  He has unobtainable toe pressures.   1501 Texted vascular on-call AP   1620 General surgery/vascular surgery AP discussed case with the vascular surgeon.  Patient will have heparin started and is get a CT of abdomen and pelvis with runoff to evaluate distal aorta and both lower extremities   1710 Contacted transfer center to set up transfer to [facility] to vascular surgery service.  Accepted by Dr. Lemus.                               SBIRT 22yo+      Flowsheet Row Most Recent Value   Initial Alcohol Screen: US AUDIT-C     1. How often do you have a drink containing alcohol? 0 Filed at: 05/19/2024 1243   2. How many drinks containing alcohol do you have on a typical day you are drinking?  0 Filed at: 05/19/2024 1243   3a. Male UNDER 65: How often do you have five or more drinks on one occasion? 0 Filed at: 05/19/2024 1243   3b. FEMALE Any Age, or MALE 65+: How often do you have 4 or more drinks on one occassion? 0 Filed at: 05/19/2024 1243   Audit-C Score 0 Filed at: 05/19/2024 1243   TRUDY: How many times in the past year have you...    Used an illegal drug or used a prescription medication for non-medical reasons? Never Filed at: 05/19/2024 1243                      Medical Decision Making  Right calf pain with  paresthesias and pain of lower leg.  No obvious recent injury.  No swelling.  There are intermittent muscle spasms visible here but he states this is not causing significantly more pain than his underlying pain.  No signs of infection.  Foot is a bit cooler than the left and I do not palpate pulses but there is no swelling or change of color.  Concern for sciatica, muscle sprain with spasm, occult fracture, ruptured Baker's cyst, arterial insufficiency.  Patient with normal D-dimer, no swelling, no risk factors for DVT.  He is unlikely to have DVT.    Arterial duplex study shows SFA occlusion with poor distal reconstitution.  Discussed with surgery AP.  CTA of abdomen pelvis with bilateral runoff performed.  There is segmental occlusion of the right above-knee popliteal artery.  IV heparin begun.  Vascular surgeon, Dr. Lemus, accepts patient in [place].  Awaiting transfer.    Amount and/or Complexity of Data Reviewed  Independent Historian: spouse  External Data Reviewed: notes.  Labs: ordered.  Radiology: ordered.  Discussion of management or test interpretation with external provider(s): Surgical AP    Risk  Prescription drug management.  Decision regarding hospitalization.             Disposition  Final diagnoses:   PAD (peripheral artery disease) ([facility])     Time reflects when diagnosis was documented in both MDM as applicable and the Disposition within this note       Time User Action Codes Description Comment    5/19/2024  3:17 PM Hank Pagan Add [I73.9] PAD (peripheral artery disease) ([facility])           ED Disposition       ED Disposition   Transfer to Another Facility-In Network    Condition   --    Date/Time   Sun May 19, 2024 1800    Comment   Taj Alfredo Vaughn should be transferred out to [facility] [place].               MD Documentation      Flowsheet Row Most Recent Value   Reason for Transfer Level of Care needed not available at this facility   Benefits of Transfer Specialized equipment and/or services  available at the receiving facility (Include comment)________________________, Other benefits (Include comment)_______________________  [Vascular surgery service]   Risks of Transfer Potential for delay in receiving treatment, Potential deterioration of medical condition, Loss of IV, Increased discomfort during transfer, Possible worsening of condition or death during transfer   Accepting Physician Allan   Accepting Facility Name, [facility]    (Name & Tel number) Erica Bell   Transported by (Company and Unit #) [facility]   Sending MD Pagan   Provider Certification General risk, such as traffic hazards, adverse weather conditions, rough terrain or turbulence, possible failure of equipment (including vehicle or aircraft), or consequences of actions of persons outside the control of the transport personnel, Unanticipated needs of medical equipment and personnel during transport, Risk of worsening condition          RN Documentation      Flowsheet Row Most Recent Value   Accepting Facility Name, [facility]    (Name & Tel number) Erica Bell   Transported by (Company and Unit #) [facility]          Follow-up Information    None         Discharge Medication List as of 5/19/2024  7:03 PM        CONTINUE these medications which have NOT CHANGED    Details   Armodafinil 250 MG tablet Take 250 mg by mouth daily as needed In the morning, Starting Fri 5/10/2024, Historical Med      TiZANidine (ZANAFLEX) 4 MG capsule Take 4 mg by mouth 3 (three) times a day, Historical Med      naproxen (EC NAPROSYN) 500 MG EC tablet Take 1 tablet (500 mg total) by mouth 2 (two) times a day with meals for 7 days, Starting Tue 3/30/2021, Until Tue 4/6/2021, Print             No discharge procedures on file.    PDMP Review       None            ED Provider  Electronically Signed by             Hank Pagan DO  05/20/24 4907

## 2024-05-19 NOTE — CONSULTS
Consultation - Vascular Surgery   Taj Mendes 44 y.o. male MRN: [id number]  Unit/Bed#: Z2 H2 Encounter: [id number]    Assessment & Plan   Symptomatic PAD with claudication  -LEAD from today, RLE with occlusion of the distal SFA with minimal distal reconstitution   -progressive RLE pain /cramping x 2 weeks, worsening today with some numbness of right forefoot  -ROM intact, mild decreased sensation over forefoot, no ischemic changes, foot is cool to touch  -RLE with 1+ palpable femoral pulse, no palpable distal pulses, unable to get doppler signals over PT or DP    PLAN:  Start high dose (VTE) heparin gtt  Check STAT CTA A/P with runoff to evaluate for iliac/aorta disease  Pain control as needed  Keep NPO in case intervention indicated this evening  Will likely require transfer to B  Re-eval after CTA imaging available      DM2  -patient on Ozempic with CGM, states BS is well controlled  last A1C in system 7.7 from 8/2022, will add to labs  Medical management    Tobacco abuse  -long time cigarette smoker since age 14  NRT  cessation        History of Present Illness     HPI:  Taj Mendes is a 44 y.o. male with PMHXxof DM2 and tobacco abuse who presented to the ED today with c/o a 2-week h/o progressive RLE pain.  Pain is worse with walking and cramping in nature mostly in posterior calf.  Today patient noticed increased pain at rest  with numbness and some burning over his right forefoot prompting him to come to ED for evaluation.  He denies any previous h/o similar symptoms although does note occasional cramping in left calf.  He states his BS have been well controlled on Ozempic with continuous glucose monitoring.  Patient denies any known h/o vascular or cardiac disease.  No h/o LE wounds or skin color changes. He denies any CP, SOB, GOMES, no fevers or chills, no cough, abdominal pain. He does note a FHx of DM and CAD in his mother.     Consults    Review of Systems   Constitutional: Negative.   Negative for appetite change, chills, fever and unexpected weight change.   HENT: Negative.     Eyes: Negative.    Respiratory: Negative.  Negative for cough, shortness of breath and wheezing.    Cardiovascular: Negative.  Negative for chest pain and palpitations.   Gastrointestinal: Negative.  Negative for abdominal pain, nausea and vomiting.   Endocrine: Negative.    Genitourinary: Negative.  Negative for difficulty urinating and dysuria.   Musculoskeletal:         Right leg pain     Skin: Negative.  Negative for color change and wound.   Allergic/Immunologic: Negative.    Neurological: Negative.  Negative for weakness and light-headedness.   Hematological: Negative.  Does not bruise/bleed easily.   Psychiatric/Behavioral: Negative.     All other systems reviewed and are negative.      Historical Information   Past Medical History:   Diagnosis Date    Prediabetes      History reviewed. No pertinent surgical history.  Social History   Social History     Substance and Sexual Activity   Alcohol Use Never     Social History     Substance and Sexual Activity   Drug Use Never     E-Cigarette/Vaping    E-Cigarette Use Former User      E-Cigarette/Vaping Substances     Social History     Tobacco Use   Smoking Status Every Day    Current packs/day: 1.00    Types: Cigarettes   Smokeless Tobacco Never     Family History: positive for DM and CAD, denies vascular disease    Meds/Allergies   all current active meds have been reviewed  No Known Allergies    Objective   First Vitals:   Blood Pressure: (!) 183/103 (05/19/24 1155)  Pulse: 89 (05/19/24 1155)  Temperature: 98.9 °F (37.2 °C) (05/19/24 1155)  Temp Source: Temporal (05/19/24 1155)  Respirations: 20 (05/19/24 1155)  SpO2: 98 % (05/19/24 1155)    Current Vitals:   Blood Pressure: 136/79 (05/19/24 1512)  Pulse: 82 (05/19/24 1512)  Temperature: 98.9 °F (37.2 °C) (05/19/24 1155)  Temp Source: Temporal (05/19/24 1155)  Respirations: 18 (05/19/24 1512)  SpO2: 97 % (05/19/24  1512)    No intake or output data in the 24 hours ending 05/19/24 1601    Invasive Devices       None                   Physical Exam  Constitutional:       General: He is not in acute distress.     Appearance: He is well-developed. He is not diaphoretic.   HENT:      Head: Normocephalic and atraumatic.      Mouth/Throat:      Mouth: Oropharynx is clear and moist.      Pharynx: No oropharyngeal exudate.   Eyes:      General: No scleral icterus.        Right eye: No discharge.         Left eye: No discharge.      Extraocular Movements: EOM normal.   Neck:      Thyroid: No thyromegaly.      Vascular: No JVD.      Trachea: No tracheal deviation.   Cardiovascular:      Rate and Rhythm: Normal rate and regular rhythm.      Heart sounds: Normal heart sounds. No murmur heard.  Pulmonary:      Effort: Pulmonary effort is normal. No respiratory distress.      Breath sounds: Normal breath sounds. No wheezing.   Abdominal:      General: Bowel sounds are normal. There is no distension.      Palpations: Abdomen is soft.      Tenderness: There is no abdominal tenderness.   Musculoskeletal:         General: No deformity or edema. Normal range of motion.      Cervical back: Normal range of motion and neck supple.      Comments: RLE with cool foot and anterior shin, no pallor or skin color changes, FROM without increased pain, mild decreased sensation forefoot    RLE 1+ femoral pulse, no palpable distal pulses  Unable to obtain doppler signal over DP or PT    LLE 1+ femoral pulse  Positive doppler signal at PT and PT   Skin:     General: Skin is warm and dry.      Findings: No rash.   Neurological:      Mental Status: He is alert and oriented to person, place, and time.      Comments: No focal deficits   Psychiatric:         Mood and Affect: Mood and affect normal.         Behavior: Behavior normal.         Lab Results: I have personally reviewed pertinent lab results.  , CBC:   Lab Results   Component Value Date    WBC 10.15  "05/19/2024    HGB 15.0 05/19/2024    HCT 45.5 05/19/2024    MCV 88 05/19/2024     05/19/2024    RBC 5.17 05/19/2024    MCH 29.0 05/19/2024    MCHC 33.0 05/19/2024    RDW 13.6 05/19/2024    MPV 9.1 05/19/2024    NRBC 0 05/19/2024   , CMP:   Lab Results   Component Value Date    SODIUM 138 05/19/2024    K 3.8 05/19/2024     05/19/2024    CO2 25 05/19/2024    BUN 14 05/19/2024    CREATININE 0.79 05/19/2024    CALCIUM 9.1 05/19/2024    AST 22 05/19/2024    ALT 36 05/19/2024    ALKPHOS 68 05/19/2024    EGFR 110 05/19/2024   , Coagulation: No results found for: \"PT\", \"INR\", \"APTT\"  Imaging: I have personally reviewed pertinent reports.    EKG, Pathology, and Other Studies: I have personally reviewed pertinent reports.      Counseling / Coordination of Care  Total floor / unit time spent today 30 minutes.  Greater than 50% of total time was spent with the patient and / or family counseling and / or coordination of care.  A description of the counseling / coordination of care    Fang Bailey      "

## 2024-05-20 ENCOUNTER — ANESTHESIA EVENT (INPATIENT)
Dept: PERIOP | Facility: HOSPITAL | Age: 44
DRG: 254 | End: 2024-05-20
Payer: COMMERCIAL

## 2024-05-20 ENCOUNTER — APPOINTMENT (INPATIENT)
Dept: RADIOLOGY | Facility: HOSPITAL | Age: 44
DRG: 254 | End: 2024-05-20
Attending: RADIOLOGY
Payer: COMMERCIAL

## 2024-05-20 ENCOUNTER — APPOINTMENT (INPATIENT)
Dept: NON INVASIVE DIAGNOSTICS | Facility: HOSPITAL | Age: 44
DRG: 254 | End: 2024-05-20
Payer: COMMERCIAL

## 2024-05-20 ENCOUNTER — ANESTHESIA (INPATIENT)
Dept: PERIOP | Facility: HOSPITAL | Age: 44
DRG: 254 | End: 2024-05-20
Payer: COMMERCIAL

## 2024-05-20 PROBLEM — F17.200 SMOKING: Status: ACTIVE | Noted: 2024-05-20

## 2024-05-20 PROBLEM — IMO0001 SMOKING: Status: ACTIVE | Noted: 2024-05-20

## 2024-05-20 LAB
ABO GROUP BLD: NORMAL
ABO GROUP BLD: NORMAL
ANION GAP SERPL CALCULATED.3IONS-SCNC: 10 MMOL/L (ref 4–13)
AORTIC ROOT: 3.2 CM
APICAL FOUR CHAMBER EJECTION FRACTION: 47 %
APTT PPP: 162 SECONDS (ref 23–37)
APTT PPP: 62 SECONDS (ref 23–37)
APTT PPP: 68 SECONDS (ref 23–37)
ASCENDING AORTA: 3.2 CM
ATRIAL RATE: 66 BPM
BASOPHILS # BLD AUTO: 0.05 THOUSANDS/ÂΜL (ref 0–0.1)
BASOPHILS NFR BLD AUTO: 1 % (ref 0–1)
BLD GP AB SCN SERPL QL: NEGATIVE
BSA FOR ECHO PROCEDURE: 2.38 M2
BUN SERPL-MCNC: 13 MG/DL (ref 5–25)
CALCIUM SERPL-MCNC: 9 MG/DL (ref 8.4–10.2)
CHLORIDE SERPL-SCNC: 104 MMOL/L (ref 96–108)
CO2 SERPL-SCNC: 26 MMOL/L (ref 21–32)
CREAT SERPL-MCNC: 0.71 MG/DL (ref 0.6–1.3)
E WAVE DECELERATION TIME: 176 MS
E/A RATIO: 1.23
EOSINOPHIL # BLD AUTO: 0.52 THOUSAND/ÂΜL (ref 0–0.61)
EOSINOPHIL NFR BLD AUTO: 6 % (ref 0–6)
ERYTHROCYTE [DISTWIDTH] IN BLOOD BY AUTOMATED COUNT: 13.9 % (ref 11.6–15.1)
FIBRINOGEN PPP-MCNC: 307 MG/DL (ref 207–520)
FRACTIONAL SHORTENING: 30 (ref 28–44)
GFR SERPL CREATININE-BSD FRML MDRD: 115 ML/MIN/1.73SQ M
GLUCOSE SERPL-MCNC: 82 MG/DL (ref 65–140)
GLUCOSE SERPL-MCNC: 82 MG/DL (ref 65–140)
HCT VFR BLD AUTO: 47.2 % (ref 36.5–49.3)
HGB BLD-MCNC: 15.3 G/DL (ref 12–17)
IMM GRANULOCYTES # BLD AUTO: 0.03 THOUSAND/UL (ref 0–0.2)
IMM GRANULOCYTES NFR BLD AUTO: 0 % (ref 0–2)
INR PPP: 1.1 (ref 0.84–1.19)
INTERVENTRICULAR SEPTUM IN DIASTOLE (PARASTERNAL SHORT AXIS VIEW): 0.9 CM
INTERVENTRICULAR SEPTUM: 0.9 CM (ref 0.6–1.1)
LAAS-AP2: 17.9 CM2
LAAS-AP4: 19.7 CM2
LEFT ATRIUM SIZE: 3.6 CM
LEFT ATRIUM VOLUME (MOD BIPLANE): 55 ML
LEFT ATRIUM VOLUME INDEX (MOD BIPLANE): 23.1 ML/M2
LEFT INTERNAL DIMENSION IN SYSTOLE: 3.7 CM (ref 2.1–4)
LEFT VENTRICLE DIASTOLIC VOLUME (MOD BIPLANE): 188 ML
LEFT VENTRICLE DIASTOLIC VOLUME INDEX (MOD BIPLANE): 79 ML/M2
LEFT VENTRICLE SYSTOLIC VOLUME (MOD BIPLANE): 104 ML
LEFT VENTRICLE SYSTOLIC VOLUME INDEX (MOD BIPLANE): 43.7 ML/M2
LEFT VENTRICULAR INTERNAL DIMENSION IN DIASTOLE: 5.3 CM (ref 3.5–6)
LEFT VENTRICULAR POSTERIOR WALL IN END DIASTOLE: 0.9 CM
LEFT VENTRICULAR STROKE VOLUME: 78 ML
LV EF: 45 %
LVSV (TEICH): 78 ML
LYMPHOCYTES # BLD AUTO: 3.49 THOUSANDS/ÂΜL (ref 0.6–4.47)
LYMPHOCYTES NFR BLD AUTO: 38 % (ref 14–44)
MAGNESIUM SERPL-MCNC: 2 MG/DL (ref 1.9–2.7)
MCH RBC QN AUTO: 29.2 PG (ref 26.8–34.3)
MCHC RBC AUTO-ENTMCNC: 32.4 G/DL (ref 31.4–37.4)
MCV RBC AUTO: 90 FL (ref 82–98)
MONOCYTES # BLD AUTO: 0.54 THOUSAND/ÂΜL (ref 0.17–1.22)
MONOCYTES NFR BLD AUTO: 6 % (ref 4–12)
MV E'TISSUE VEL-SEP: 11 CM/S
MV PEAK A VEL: 0.78 M/S
MV PEAK E VEL: 96 CM/S
MV STENOSIS PRESSURE HALF TIME: 51 MS
MV VALVE AREA P 1/2 METHOD: 4.31
NEUTROPHILS # BLD AUTO: 4.54 THOUSANDS/ÂΜL (ref 1.85–7.62)
NEUTS SEG NFR BLD AUTO: 49 % (ref 43–75)
NRBC BLD AUTO-RTO: 0 /100 WBCS
P AXIS: 6 DEGREES
PHOSPHATE SERPL-MCNC: 3.7 MG/DL (ref 2.7–4.5)
PLATELET # BLD AUTO: 272 THOUSANDS/UL (ref 149–390)
PMV BLD AUTO: 9.2 FL (ref 8.9–12.7)
POTASSIUM SERPL-SCNC: 3.9 MMOL/L (ref 3.5–5.3)
PR INTERVAL: 180 MS
PROTHROMBIN TIME: 14.1 SECONDS (ref 11.6–14.5)
QRS AXIS: -10 DEGREES
QRSD INTERVAL: 120 MS
QT INTERVAL: 394 MS
QTC INTERVAL: 413 MS
RBC # BLD AUTO: 5.24 MILLION/UL (ref 3.88–5.62)
RH BLD: POSITIVE
RH BLD: POSITIVE
RIGHT ATRIUM AREA SYSTOLE A4C: 16.1 CM2
RIGHT VENTRICLE ID DIMENSION: 3.4 CM
SL CV LEFT ATRIUM LENGTH A2C: 4.9 CM
SL CV LV EF: 55
SL CV PED ECHO LEFT VENTRICLE DIASTOLIC VOLUME (MOD BIPLANE) 2D: 136 ML
SL CV PED ECHO LEFT VENTRICLE SYSTOLIC VOLUME (MOD BIPLANE) 2D: 59 ML
SODIUM SERPL-SCNC: 140 MMOL/L (ref 135–147)
SPECIMEN EXPIRATION DATE: NORMAL
T WAVE AXIS: 25 DEGREES
TRICUSPID ANNULAR PLANE SYSTOLIC EXCURSION: 2 CM
VENTRICULAR RATE: 66 BPM
WBC # BLD AUTO: 9.17 THOUSAND/UL (ref 4.31–10.16)

## 2024-05-20 PROCEDURE — 99232 SBSQ HOSP IP/OBS MODERATE 35: CPT | Performed by: SURGERY

## 2024-05-20 PROCEDURE — C1887 CATHETER, GUIDING: HCPCS | Performed by: SURGERY

## 2024-05-20 PROCEDURE — C1769 GUIDE WIRE: HCPCS | Performed by: SURGERY

## 2024-05-20 PROCEDURE — 80048 BASIC METABOLIC PNL TOTAL CA: CPT | Performed by: PHYSICIAN ASSISTANT

## 2024-05-20 PROCEDURE — 76937 US GUIDE VASCULAR ACCESS: CPT

## 2024-05-20 PROCEDURE — 85610 PROTHROMBIN TIME: CPT

## 2024-05-20 PROCEDURE — 93306 TTE W/DOPPLER COMPLETE: CPT | Performed by: INTERNAL MEDICINE

## 2024-05-20 PROCEDURE — 99223 1ST HOSP IP/OBS HIGH 75: CPT | Performed by: INTERNAL MEDICINE

## 2024-05-20 PROCEDURE — 84100 ASSAY OF PHOSPHORUS: CPT | Performed by: PHYSICIAN ASSISTANT

## 2024-05-20 PROCEDURE — C1894 INTRO/SHEATH, NON-LASER: HCPCS | Performed by: SURGERY

## 2024-05-20 PROCEDURE — 85730 THROMBOPLASTIN TIME PARTIAL: CPT | Performed by: SURGERY

## 2024-05-20 PROCEDURE — C1757 CATH, THROMBECTOMY/EMBOLECT: HCPCS

## 2024-05-20 PROCEDURE — 85025 COMPLETE CBC W/AUTO DIFF WBC: CPT | Performed by: PHYSICIAN ASSISTANT

## 2024-05-20 PROCEDURE — 37184 PRIM ART M-THRMBC 1ST VSL: CPT | Performed by: SURGERY

## 2024-05-20 PROCEDURE — C2623 CATH, TRANSLUMIN, DRUG-COAT: HCPCS | Performed by: SURGERY

## 2024-05-20 PROCEDURE — 85384 FIBRINOGEN ACTIVITY: CPT | Performed by: PHYSICIAN ASSISTANT

## 2024-05-20 PROCEDURE — 83735 ASSAY OF MAGNESIUM: CPT | Performed by: PHYSICIAN ASSISTANT

## 2024-05-20 PROCEDURE — 93010 ELECTROCARDIOGRAM REPORT: CPT | Performed by: INTERNAL MEDICINE

## 2024-05-20 PROCEDURE — 93005 ELECTROCARDIOGRAM TRACING: CPT

## 2024-05-20 PROCEDURE — 82948 REAGENT STRIP/BLOOD GLUCOSE: CPT

## 2024-05-20 PROCEDURE — 04CM3ZZ EXTIRPATION OF MATTER FROM RIGHT POPLITEAL ARTERY, PERCUTANEOUS APPROACH: ICD-10-PCS | Performed by: SURGERY

## 2024-05-20 PROCEDURE — 86850 RBC ANTIBODY SCREEN: CPT | Performed by: PHYSICIAN ASSISTANT

## 2024-05-20 PROCEDURE — B41F1ZZ FLUOROSCOPY OF RIGHT LOWER EXTREMITY ARTERIES USING LOW OSMOLAR CONTRAST: ICD-10-PCS | Performed by: SURGERY

## 2024-05-20 PROCEDURE — 37224 PR REVSC OPN/PRG FEM/POP W/ANGIOPLASTY UNI: CPT | Performed by: SURGERY

## 2024-05-20 PROCEDURE — 047M3Z1 DILATION OF RIGHT POPLITEAL ARTERY USING DRUG-COATED BALLOON, PERCUTANEOUS APPROACH: ICD-10-PCS | Performed by: SURGERY

## 2024-05-20 PROCEDURE — 86900 BLOOD TYPING SEROLOGIC ABO: CPT | Performed by: PHYSICIAN ASSISTANT

## 2024-05-20 PROCEDURE — C1760 CLOSURE DEV, VASC: HCPCS | Performed by: SURGERY

## 2024-05-20 PROCEDURE — 93306 TTE W/DOPPLER COMPLETE: CPT

## 2024-05-20 PROCEDURE — 86901 BLOOD TYPING SEROLOGIC RH(D): CPT | Performed by: PHYSICIAN ASSISTANT

## 2024-05-20 DEVICE — STARCLOSE SE VASCULAR CLOSURE SYSTEM
Type: IMPLANTABLE DEVICE | Site: GROIN | Status: FUNCTIONAL
Brand: STARCLOSE SE

## 2024-05-20 RX ORDER — HEPARIN SODIUM 1000 [USP'U]/ML
2000 INJECTION, SOLUTION INTRAVENOUS; SUBCUTANEOUS EVERY 6 HOURS PRN
Status: DISCONTINUED | OUTPATIENT
Start: 2024-05-20 | End: 2024-05-21

## 2024-05-20 RX ORDER — ONDANSETRON 2 MG/ML
INJECTION INTRAMUSCULAR; INTRAVENOUS AS NEEDED
Status: DISCONTINUED | OUTPATIENT
Start: 2024-05-20 | End: 2024-05-20

## 2024-05-20 RX ORDER — FENTANYL CITRATE 50 UG/ML
INJECTION, SOLUTION INTRAMUSCULAR; INTRAVENOUS AS NEEDED
Status: DISCONTINUED | OUTPATIENT
Start: 2024-05-20 | End: 2024-05-20

## 2024-05-20 RX ORDER — CEFAZOLIN SODIUM 2 G/50ML
SOLUTION INTRAVENOUS AS NEEDED
Status: DISCONTINUED | OUTPATIENT
Start: 2024-05-20 | End: 2024-05-20

## 2024-05-20 RX ORDER — PROPOFOL 10 MG/ML
INJECTION, EMULSION INTRAVENOUS AS NEEDED
Status: DISCONTINUED | OUTPATIENT
Start: 2024-05-20 | End: 2024-05-20

## 2024-05-20 RX ORDER — ONDANSETRON 2 MG/ML
4 INJECTION INTRAMUSCULAR; INTRAVENOUS ONCE AS NEEDED
Status: DISCONTINUED | OUTPATIENT
Start: 2024-05-20 | End: 2024-05-20 | Stop reason: HOSPADM

## 2024-05-20 RX ORDER — ATORVASTATIN CALCIUM 20 MG/1
20 TABLET, FILM COATED ORAL
Status: DISCONTINUED | OUTPATIENT
Start: 2024-05-20 | End: 2024-05-21 | Stop reason: HOSPADM

## 2024-05-20 RX ORDER — ASPIRIN 325 MG
325 TABLET ORAL DAILY
Status: DISCONTINUED | OUTPATIENT
Start: 2024-05-20 | End: 2024-05-21 | Stop reason: HOSPADM

## 2024-05-20 RX ORDER — NITROGLYCERIN 5 MG/ML
INJECTION, SOLUTION INTRAVENOUS AS NEEDED
Status: DISCONTINUED | OUTPATIENT
Start: 2024-05-20 | End: 2024-05-20 | Stop reason: HOSPADM

## 2024-05-20 RX ORDER — HEPARIN SODIUM 10000 [USP'U]/100ML
3-20 INJECTION, SOLUTION INTRAVENOUS
Status: DISCONTINUED | OUTPATIENT
Start: 2024-05-20 | End: 2024-05-21

## 2024-05-20 RX ORDER — FENTANYL CITRATE/PF 50 MCG/ML
50 SYRINGE (ML) INJECTION
Status: DISCONTINUED | OUTPATIENT
Start: 2024-05-20 | End: 2024-05-20 | Stop reason: HOSPADM

## 2024-05-20 RX ORDER — CHLORHEXIDINE GLUCONATE ORAL RINSE 1.2 MG/ML
15 SOLUTION DENTAL ONCE
Status: COMPLETED | OUTPATIENT
Start: 2024-05-20 | End: 2024-05-20

## 2024-05-20 RX ORDER — ROCURONIUM BROMIDE 10 MG/ML
INJECTION, SOLUTION INTRAVENOUS AS NEEDED
Status: DISCONTINUED | OUTPATIENT
Start: 2024-05-20 | End: 2024-05-20

## 2024-05-20 RX ORDER — MIDAZOLAM HYDROCHLORIDE 2 MG/2ML
INJECTION, SOLUTION INTRAMUSCULAR; INTRAVENOUS AS NEEDED
Status: DISCONTINUED | OUTPATIENT
Start: 2024-05-20 | End: 2024-05-20

## 2024-05-20 RX ORDER — HEPARIN SODIUM 1000 [USP'U]/ML
INJECTION, SOLUTION INTRAVENOUS; SUBCUTANEOUS AS NEEDED
Status: DISCONTINUED | OUTPATIENT
Start: 2024-05-20 | End: 2024-05-20

## 2024-05-20 RX ORDER — RIVAROXABAN 15 MG-20MG
1 KIT ORAL SEE ADMIN INSTRUCTIONS
Qty: 1 EACH | Refills: 0 | Status: SHIPPED | OUTPATIENT
Start: 2024-05-20

## 2024-05-20 RX ORDER — IODIXANOL 320 MG/ML
INJECTION, SOLUTION INTRAVASCULAR AS NEEDED
Status: DISCONTINUED | OUTPATIENT
Start: 2024-05-20 | End: 2024-05-20 | Stop reason: HOSPADM

## 2024-05-20 RX ORDER — SODIUM CHLORIDE 9 MG/ML
50 INJECTION, SOLUTION INTRAVENOUS CONTINUOUS
Status: DISCONTINUED | OUTPATIENT
Start: 2024-05-20 | End: 2024-05-21

## 2024-05-20 RX ORDER — HEPARIN SODIUM 200 [USP'U]/100ML
INJECTION, SOLUTION INTRAVENOUS
Status: COMPLETED | OUTPATIENT
Start: 2024-05-20 | End: 2024-05-20

## 2024-05-20 RX ORDER — LIDOCAINE HYDROCHLORIDE 10 MG/ML
INJECTION, SOLUTION EPIDURAL; INFILTRATION; INTRACAUDAL; PERINEURAL AS NEEDED
Status: DISCONTINUED | OUTPATIENT
Start: 2024-05-20 | End: 2024-05-20

## 2024-05-20 RX ORDER — HEPARIN SODIUM 1000 [USP'U]/ML
4000 INJECTION, SOLUTION INTRAVENOUS; SUBCUTANEOUS EVERY 6 HOURS PRN
Status: DISCONTINUED | OUTPATIENT
Start: 2024-05-20 | End: 2024-05-21

## 2024-05-20 RX ADMIN — HEPARIN SODIUM 11.1 UNITS/KG/HR: 10000 INJECTION, SOLUTION INTRAVENOUS at 20:56

## 2024-05-20 RX ADMIN — ONDANSETRON 4 MG: 2 INJECTION INTRAMUSCULAR; INTRAVENOUS at 14:24

## 2024-05-20 RX ADMIN — SODIUM CHLORIDE 50 ML/HR: 0.9 INJECTION, SOLUTION INTRAVENOUS at 18:26

## 2024-05-20 RX ADMIN — SUGAMMADEX 200 MG: 100 INJECTION, SOLUTION INTRAVENOUS at 16:19

## 2024-05-20 RX ADMIN — SODIUM CHLORIDE 125 ML/HR: 0.9 INJECTION, SOLUTION INTRAVENOUS at 00:03

## 2024-05-20 RX ADMIN — ASPIRIN 325 MG ORAL TABLET 325 MG: 325 PILL ORAL at 08:57

## 2024-05-20 RX ADMIN — LIDOCAINE HYDROCHLORIDE 50 MG: 10 INJECTION, SOLUTION EPIDURAL; INFILTRATION; INTRACAUDAL; PERINEURAL at 14:33

## 2024-05-20 RX ADMIN — OXYCODONE HYDROCHLORIDE 10 MG: 10 TABLET ORAL at 02:21

## 2024-05-20 RX ADMIN — FENTANYL CITRATE 50 MCG: 50 INJECTION INTRAMUSCULAR; INTRAVENOUS at 14:42

## 2024-05-20 RX ADMIN — PHENYLEPHRINE HYDROCHLORIDE 30 MCG/MIN: 10 INJECTION INTRAVENOUS at 15:40

## 2024-05-20 RX ADMIN — CHLORHEXIDINE GLUCONATE 15 ML: 1.2 SOLUTION ORAL at 08:57

## 2024-05-20 RX ADMIN — HEPARIN SODIUM 6000 UNITS: 1000 INJECTION INTRAVENOUS; SUBCUTANEOUS at 15:39

## 2024-05-20 RX ADMIN — HEPARIN SODIUM 15 UNITS/KG/HR: 10000 INJECTION, SOLUTION INTRAVENOUS at 08:42

## 2024-05-20 RX ADMIN — MIDAZOLAM 2 MG: 1 INJECTION INTRAMUSCULAR; INTRAVENOUS at 14:24

## 2024-05-20 RX ADMIN — ROCURONIUM BROMIDE 50 MG: 10 INJECTION, SOLUTION INTRAVENOUS at 14:34

## 2024-05-20 RX ADMIN — SODIUM CHLORIDE 125 ML/HR: 0.9 INJECTION, SOLUTION INTRAVENOUS at 08:42

## 2024-05-20 RX ADMIN — PROPOFOL 300 MG: 10 INJECTION, EMULSION INTRAVENOUS at 14:34

## 2024-05-20 RX ADMIN — CEFAZOLIN SODIUM 2000 MG: 2 SOLUTION INTRAVENOUS at 14:47

## 2024-05-20 RX ADMIN — FENTANYL CITRATE 100 MCG: 50 INJECTION INTRAMUSCULAR; INTRAVENOUS at 14:33

## 2024-05-20 NOTE — CONSULTS
Consultation - Cardiology Team One  Taj Mendes 44 y.o. male MRN: [id number]  Unit/Bed#: [facility] 523-01 Encounter: [id number]    Inpatient consult to Cardiology  Consult performed by: Makenzie Acevedo PA-C  Consult ordered by: Meghan Banegas PA-C          Physician Requesting Consult: Krish Lemus MD  Reason for Consult / Principal Problem: Arterial occlusion    Assessment:    1.  Right popliteal artery occlusion: Presented with 2 weeks of progressive RLE pain in the right calf with initial claudication that progressed to rest pain.  LEADS revealed right distal SFA occlusion with NELSON of 0.17.  CTA revealed occlusion of the above-knee right popliteal artery.  Currently on aspirin, statin and heparin infusion we will plan for IR arteriogram with popliteal thrombectomy possible lysis.  Echocardiogram pending  Telemetry unremarkable    2.  Type II DM: Maintained on Ozempic    3.  Tobacco abuse: Complete smoking cessation advised    Plan/Recommendations:  Follow-up on echocardiogram to evaluate heart function and valvular status  Based on findings may require a EDEN for further workup  Continue to monitor on telemetry  Can consider an outpatient ZIO monitor  ______________________________________________________________________    CC: Leg Pain      History of Present Illness   HPI: Taj Mendes is a 44 y.o. year old male who has type II DM and tobacco abuse presented to the emergency room at [facility] on 5/19/2024 with complaints of leg pain.  Patient reported right calf cramping going on for 2 weeks described as a severe burning down the anterior aspect of his right lower leg to the dorsum of the foot with associated coldness and numbness.  Patient was noted to have cold right foot with absent pulses.  Labs revealed occlusion of the distal SFA with minimal distal reconstitution with an NELSON of 0.17, left NELSON was 1.03.  STAT CTA abdomen with runoff revealed segmental occlusion of the  above-knee right popliteal artery.  EKG revealed sinus rhythm with nonspecific interventricular conduction delay.  Labs revealed stable CMP, stable CBC, negative D-dimer.  Patient was started on heparin infusion and transferred to [facility] for IR arteriogram and intervention.  An echocardiogram was ordered and cardiology has been consulted for embolic workup.    Patient resting in bed during consultation and relates the above-mentioned history.  Currently he continues to have a dull burning pain in his right leg.  He is anxious to get his intervention this morning.    Family history: Mother had a heart attack in her late 60s with subsequent ICD    Social history: 30-pack-year history of tobacco abuse.  Denies any illicit drug use.  Social alcohol.    EKG reviewed personally: 5/20/2024- normal sinus rhythm at a rate of 66 bpm with a nonspecific intraventricular conduction delay.  No specific change when compared to the EKG from 8/22/2022.    Telemetry reviewed personally: Normal sinus rhythm with heart rates in the 60-80s        Review of Systems   Constitutional: Negative. Negative for chills.   Cardiovascular:  Negative for chest pain, dyspnea on exertion, leg swelling, near-syncope, orthopnea, palpitations, paroxysmal nocturnal dyspnea and syncope.   Respiratory: Negative.  Negative for cough, shortness of breath and wheezing.    Endocrine: Negative.    Hematologic/Lymphatic: Negative.    Skin: Negative.    Musculoskeletal: Negative.         Right lower extremity pain   Gastrointestinal: Negative.  Negative for diarrhea, nausea and vomiting.   Neurological:  Negative for dizziness, light-headedness and weakness.   Psychiatric/Behavioral: Negative.  Negative for altered mental status.    All other systems reviewed and are negative.    Historical Information   Past Medical History:   Diagnosis Date    Prediabetes      No past surgical history on file.  Social History     Substance and Sexual Activity   Alcohol Use  Never     Social History     Substance and Sexual Activity   Drug Use Never     Social History     Tobacco Use   Smoking Status Every Day    Current packs/day: 1.00    Types: Cigarettes   Smokeless Tobacco Never     Family History: No family history on file.    Meds/Allergies   all current active meds have been reviewed, current meds:   Current Facility-Administered Medications   Medication Dose Route Frequency    acetaminophen (TYLENOL) tablet 650 mg  650 mg Oral Q4H PRN    aspirin tablet 325 mg  325 mg Oral Daily    atorvastatin (LIPITOR) tablet 20 mg  20 mg Oral Daily With Dinner    chlorhexidine (PERIDEX) 0.12 % oral rinse 15 mL  15 mL Swish & Spit Once    heparin (porcine) 25,000 units in 0.45% NaCl 250 mL infusion (premix)  3-30 Units/kg/hr (Order-Specific) Intravenous Titrated    heparin (porcine) injection 4,400 Units  4,400 Units Intravenous Q6H PRN    heparin (porcine) injection 8,800 Units  8,800 Units Intravenous Q6H PRN    HYDROmorphone (DILAUDID) injection 0.5 mg  0.5 mg Intravenous Q2H PRN    nicotine (NICODERM CQ) 21 mg/24 hr TD 24 hr patch 1 patch  1 patch Transdermal Daily    ondansetron (ZOFRAN) injection 4 mg  4 mg Intravenous Q4H PRN    oxyCODONE (ROXICODONE) IR tablet 5 mg  5 mg Oral Q4H PRN    Or    oxyCODONE (ROXICODONE) immediate release tablet 10 mg  10 mg Oral Q4H PRN    sodium chloride 0.9 % infusion  125 mL/hr Intravenous Continuous   , and PTA meds:   Prior to Admission Medications   Prescriptions Last Dose Informant Patient Reported? Taking?   Armodafinil 250 MG tablet 5/18/2024  Yes Yes   Sig: Take 250 mg by mouth daily as needed In the morning   Ozempic, 2 MG/DOSE, 8 MG/3ML injection pen   Yes Yes   Sig: Inject 2 mg under the skin every 7 days   TiZANidine (ZANAFLEX) 4 MG capsule 5/18/2024  Yes Yes   Sig: Take 4 mg by mouth 3 (three) times a day   naproxen (EC NAPROSYN) 500 MG EC tablet   No No   Sig: Take 1 tablet (500 mg total) by mouth 2 (two) times a day with meals for 7 days     "  Facility-Administered Medications: None     heparin (porcine), 3-30 Units/kg/hr (Order-Specific), Last Rate: 15 Units/kg/hr (244)  sodium chloride, 125 mL/hr, Last Rate: 125 mL/hr (24 0003)        Allergies   Allergen Reactions    Shellfish-Derived Products - Food Allergy Itching       Objective   Vitals: Blood pressure 146/80, pulse 71, temperature 98.1 °F (36.7 °C), resp. rate 12, height 6' 2\" (1.88 m), weight 113 kg (248 lb 10.9 oz), SpO2 99%.,     Body mass index is 31.93 kg/m².,     Systolic (24hrs), Av , Min:135 , Max:183     Diastolic (24hrs), Av, Min:70, Max:103    Wt Readings from Last 3 Encounters:   24 113 kg (248 lb 10.9 oz)   24 113 kg (248 lb 14.4 oz)   22 122 kg (269 lb)      Lab Results   Component Value Date    CREATININE 0.71 2024    CREATININE 0.79 2024    CREATININE 0.92 2022             Intake/Output Summary (Last 24 hours) at 2024 0821  Last data filed at 2024 0701  Gross per 24 hour   Intake 156.59 ml   Output 1525 ml   Net -1368.41 ml     Weight (last 2 days)       Date/Time Weight    24 1900 113 (248.68)          Invasive Devices       Peripheral Intravenous Line  Duration             Peripheral IV 24 Right;Ventral (anterior) Forearm <1 day                      Physical Exam  Vitals and nursing note reviewed.   Constitutional:       General: He is not in acute distress.     Appearance: He is well-developed. He is obese.      Comments: On RA in NAD   HENT:      Head: Normocephalic and atraumatic.   Neck:      Vascular: No JVD.   Cardiovascular:      Rate and Rhythm: Normal rate and regular rhythm.      Heart sounds: Normal heart sounds. No murmur heard.     No friction rub. No gallop.   Pulmonary:      Effort: Pulmonary effort is normal. No respiratory distress.      Breath sounds: Normal breath sounds. No wheezing or rales.   Chest:      Chest wall: No tenderness.   Abdominal:      General: Bowel sounds are " "normal. There is no distension.      Palpations: Abdomen is soft.      Tenderness: There is no abdominal tenderness.   Musculoskeletal:         General: No tenderness. Normal range of motion.      Cervical back: Normal range of motion and neck supple.      Right lower leg: No edema.      Left lower leg: No edema.   Skin:     General: Skin is warm and dry.      Coloration: Skin is not pale.      Findings: No erythema.   Neurological:      Mental Status: He is alert and oriented to person, place, and time.   Psychiatric:         Mood and Affect: Mood normal.         Behavior: Behavior normal.         Thought Content: Thought content normal.         Judgment: Judgment normal.           LABORATORY RESULTS:  Results from last 7 days   Lab Units 05/19/24  1206   CK TOTAL U/L 147     CBC with diff:   Results from last 7 days   Lab Units 05/20/24 0449 05/19/24 2006 05/19/24  1206   WBC Thousand/uL 9.17 10.38* 10.15   HEMOGLOBIN g/dL 15.3 14.7 15.0   HEMATOCRIT % 47.2 44.8 45.5   MCV fL 90 89 88   PLATELETS Thousands/uL 272 252 268   RBC Million/uL 5.24 5.05 5.17   MCH pg 29.2 29.1 29.0   MCHC g/dL 32.4 32.8 33.0   RDW % 13.9 14.0 13.6   MPV fL 9.2 8.9 9.1   NRBC AUTO /100 WBCs 0  --  0       CMP:  Results from last 7 days   Lab Units 05/20/24 0449 05/19/24  1206   POTASSIUM mmol/L 3.9 3.8   CHLORIDE mmol/L 104 105   CO2 mmol/L 26 25   BUN mg/dL 13 14   CREATININE mg/dL 0.71 0.79   CALCIUM mg/dL 9.0 9.1   AST U/L  --  22   ALT U/L  --  36   ALK PHOS U/L  --  68   EGFR ml/min/1.73sq m 115 110       BMP:  Results from last 7 days   Lab Units 05/20/24 0449 05/19/24  1206   POTASSIUM mmol/L 3.9 3.8   CHLORIDE mmol/L 104 105   CO2 mmol/L 26 25   BUN mg/dL 13 14   CREATININE mg/dL 0.71 0.79   CALCIUM mg/dL 9.0 9.1          No results found for: \"NTBNP\"         Results from last 7 days   Lab Units 05/20/24 0449 05/19/24  1206   MAGNESIUM mg/dL 2.0 2.0                     Results from last 7 days   Lab Units 05/19/24 2006 " "05/19/24  1623   INR  1.07 0.98     Lipid Profile:   No results found for: \"CHOL\"  Lab Results   Component Value Date    HDL 28 (L) 08/22/2022     Lab Results   Component Value Date    LDLCALC  08/22/2022      Comment:      Calculated LDL invalid, triglycerides >400 mg/dl  This screening LDL is a calculated result.   It does not have the accuracy of the Direct Measured LDL in the monitoring of patients with hyperlipidemia and/or statin therapy.   Direct Measure LDL (ACM560) must be ordered separately in these patients.     Lab Results   Component Value Date    TRIG 1,282 (H) 08/22/2022         Cardiac testing:   No results found for this or any previous visit.    No results found for this or any previous visit.    No valid procedures specified.  No results found for this or any previous visit.        Imaging: I have personally reviewed pertinent reports.    CTA abdominal w run off w wo contrast    Result Date: 5/19/2024  Narrative: CT ANGIOGRAM OF THE AORTA AND LOWER EXTREMITIES WITH IV CONTRAST INDICATION: \"PAD with decreased flow to legs, abnormal arterial duplex\" COMPARISON: None. TECHNIQUE: CT angiogram examination of the abdomen, pelvis, and lower extremities was performed according to standard protocol with intravenous contrast. This examination, like all CT scans performed in the [facility] beds at the [facility], was performed utilizing techniques to minimize radiation dose exposure, including the use of iterative reconstruction and automated exposure control. 3D reconstructions were performed an independent workstation, and are supplied for review. Rad dose 3404.86 mGy-cm IV Contrast: 120 mL of iohexol (OMNIPAQUE) Enteric Contrast: Not administered. FINDINGS: VESSELS: Abdominal aorta is moderately atherosclerotic with irregular predominantly noncalcified plaques. No significant stenosis identified. Celiac artery is patent. SMA is patent. SARAH is patent. Bilateral renal arteries are patent without significant " stenosis. Bilateral common iliac arteries are mildly atherosclerotic without significant stenosis; there is a small web or a tiny dissection flap at the proximal left common iliac artery without causing significant stenosis (series 2 image #118). There is high-grade stenosis at the origin of the left internal iliac artery. The right internal iliac artery is patent without significant stenosis. Bilateral external iliac arteries are patent without significant stenosis. Right lower extremity: CFA is patent without significant stenosis. Deep moderate focal stenosis (approximately 50%) at the proximal SFA (series 2, image #227). There is a 6 cm segment of occlusion at the P1 segment. The remaining popliteal artery is reconstituted but diffusely diseased. Patent three-vessel runoff. High-grade stenosis at the origin of the peroneal artery. Dorsalis pedis and the plantar arteries are patent. Left lower extremity: CFA is patent without significant stenosis. Deep femoral artery is patent without significant stenosis. Mild irregularities along the SFA without significant stenosis. Popliteal artery is mildly diseased without significant stenosis. Patent three-vessel runoff. Patent dorsalis pedis, medial and lateral plantar arteries. OTHER FINDINGS ABDOMEN LOWER CHEST: Bibasilar atelectasis. LIVER/BILIARY TREE: There is a subcentimeter arterially enhancing lesion in segment 6 (series 2 image #86), too small to characterize probably a flash hemangioma. GALLBLADDER: No calcified gallstones. No pericholecystic inflammatory change. SPLEEN: Unremarkable. PANCREAS: Unremarkable. ADRENAL GLANDS: Unremarkable. KIDNEYS/URETERS: No hydronephrosis. Multiple left-sided nonobstructive renal calculi with the largest measuring up to 7 mm. The calculi are annotated by arrows on series 2. PELVIS REPRODUCTIVE ORGANS: Unremarkable for patient's age. URINARY BLADDER: Unremarkable. ADDITIONAL ABDOMINAL AND PELVIC STRUCTURES STOMACH AND BOWEL:  Unremarkable. APPENDIX: No findings to suggest appendicitis. ABDOMINOPELVIC CAVITY: No ascites. No pneumoperitoneum. No lymphadenopathy. ABDOMINAL WALL/INGUINAL REGIONS: Small fat-containing umbilical hernia. BONES: No acute fracture or suspicious osseous lesion.     Impression: 1.  Segmental occlusion (6 cm in length) of the above-knee right popliteal artery. Differential considerations include thromboembolism and in situ thrombosis. 2.  Left nephrolithiasis. 3.  Additional findings as described above. The study was marked in EPIC for immediate notification. Workstation performed: [id number]         Counseling / Coordination of Care  Total floor / unit time spent today 45 minutes.  Greater than 50% of total time was spent with the patient and / or family counseling and / or coordination of care.  A description of the counseling / coordination of care: Review of history, current assessment, development of a plan.      Code Status: Level 1 - Full Code    ** Please Note: Dragon 360 Dictation voice to text software may have been used in the creation of this document. **

## 2024-05-20 NOTE — ANESTHESIA PREPROCEDURE EVALUATION
Procedure:  ARTERIOGRAM; RLE Agram, Angiojet thrombectomy (Right: Abdomen)    Relevant Problems   CARDIO   (+) Popliteal artery occlusion, right (HCC)      PULMONARY   (+) Smoking     EKG:  Normal sinus rhythm  Non-specific intra-ventricular conduction delay  Borderline ECG  When compared with ECG of 22-AUG-2022 21:17,  No significant change was found  Confirmed by Nabor Gardner ([id number]) on 5/20/2024 6:21:28 AM    TTE: later today    CBC & BMP: wnl    Physical Exam    Airway    Mallampati score: II  TM Distance: >3 FB  Neck ROM: full     Dental       Cardiovascular      Pulmonary      Other Findings    Anesthesia Plan  ASA Score- 2     Anesthesia Type- general with ASA Monitors.         Additional Monitors:     Airway Plan: ETT.           Plan Factors-    Chart reviewed. EKG reviewed.  Existing labs reviewed. Patient summary reviewed.    Patient is a current smoker.  Patient did not smoke on day of surgery.            Induction- intravenous.    Postoperative Plan- Plan for postoperative opioid use. Planned trial extubation    Perioperative Resuscitation Plan - Level 1 - Full Code.       Informed Consent- Anesthetic plan and risks discussed with patient.  I personally reviewed this patient with the CRNA. Discussed and agreed on the Anesthesia Plan with the CRNA..

## 2024-05-20 NOTE — OP NOTE
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]    Procedure(s):  #1 ultrasound-guided percutaneous access to the left common femoral artery  #2 aortogram  #3 iliac angiogram  #4 catheter placement in the right popliteal artery  #5 right lower extremity angiogram  #6 mechanical thrombectomy using CAT 7 penumbra device of the right popliteal artery  #7 drug-coated balloon angioplasty of the P1 P2 popliteal artery using a 5mm x 6cm [place] balloon  #8 supervision and interpretation of all radiologic imaging  #9 intra-arterial administration 400 mcg of nitro      Specimen(s):  * No specimens in log *    Estimated Blood Loss:   Minimal    Drains:  [REMOVED] Urethral Catheter Latex 16 Fr. 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  He had no prior antecedent history of claudication he is a smoker and he is a diabetic. He was worked up with arterial duplex and subsequent CAT scan.   This was consistent with a right popliteal embolus.  He was started on heparin and we recommended popliteal thrombectomy we discussed options including percutaneous thrombectomy as well as traditional open thrombectomy.  Risks benefits and alternative therapies were discussed with him in detail and he consented to proceed.       Operative Findings:  Patent infrarenal aorta and single renal  arteries bilaterally patent iliac vessels bilaterally on the left side the common femoral was patent as was the profunda as was the proximal left SFA.  The right common femoral was patent as was the right profunda femoris the right SFA was patent there was a mild proximal SFA stenosis the remainder of the SFA was widely patent there is occlusion of the P1 P2 popliteal segment which was suspicious for embolic phenomenon the P3 popliteal was patent and there is three-vessel runoff after initial mechanical thrombectomy there was some approximately 50% residual stenosis in the P1 pop this was ballooned with a 5 mm [place] with an improved result there is still approximately 30% residual stenosis which I elected not to treat further.  The patient had a palpable right dorsalis pedis pulse at the end of the procedure          Complications:   None    Procedure and Technique:  Informed consent was obtained from the appropriate part of the patient was correctly identified in the holding area brought to the operating placed in supine position appropriate lines monitors were placed after satisfactory induction of general endotracheal anesthesia the groins and right lower extremity circumferentially were prepped and draped in the normal sterile fashion.  A Jako timeout and periprocedural antibiotics were given.  We began by identifying the left common femoral artery using ultrasound we then accessed this using a micropuncture needle over the 014 wire this exchanged for a micropuncture short sheath a left common femoral retrograde sheath angiogram was performed the above-stated findings were then advanced a Bentson wire into the infrarenal aorta and exchanged existing sheath for a 5 [place] short sheath.  We then advanced an RBI catheter to the level of the renal arteries and performed an aortogram with the above-stated findings we then withdrew the catheter catheter to the level of the verification form and iliac angiogram with  the above-stated findings.  We then selected the right common iliac using a RBI catheter and with a stiff angled Glidewire advanced a wire catheter to the level of the right common femoral artery we then performed stepwise right lower extremity runoff angiogram with the above-stated findings.  We then elected to intervene advanced a stiff Glidewire into the distal right SFA remove the existing catheter and exchanged existing sheath for a 7 [place] by 45 cm [place] destination.  I did give an additional bolus of 6000 units of IV heparin at this point I then prepared the CAT 7 penumbra device and this was advanced over the Glidewire to the level of the distal SFA and then I used the penumbra to perform mechanical thrombectomy of the P1 and P2 popliteal segment I then aspirated the catheter and performed a control angiogram through the penumbra catheter which demonstrated a good result did not appear to be any more acute thrombus but there was some chronic appearing mild to moderate disease in the P1 popliteal artery.  I then advanced a Glidewire distally into the popliteal artery and then exchanged using a seeker catheter for an 014 [place] wire and then over this wire I balloon angioplastied the P1 and P2 popliteal artery using a 5 mm x 60 Martin drug-coated balloon balloon was taken to nominal pressure for 3 minutes initially and then a control angiogram was performed there is still approximately 30% residual stenosis I elected to reballoon this taken the balloon to 10 isidoro yielding a 5.3 mm diameter.  This was held up for a minute and after minute another control angiogram was performed which demonstrated about 20 to 30% residual stenosis I felt as though this was either a chronic lesion that was nonflow limiting or some laminated thrombus and given his age I elected not to place a stent in this location.  The patient was then given 40 mcg of nitroglycerin for some minor vasospasm the sheath was removed up and  over the arctic bifurcation over a Bentson wire and the access site was closed using a StarClose.         I was present for the entire procedure.    Patient Disposition:  PACU       Vascular Quality Initiative - Peripheral Vascular Intervention     Urgency: Urgent    Functional Status:  Fully active; able to carry on all predisease activities without restriction.   Ambulation: Amb = independently ambulatory    Leg Symptoms    Right: Acute Ischemia:  Acute limb ischemia is defined as a sudden decrease in limb perfusion that causes a potential threat to limb viability (manifested by ischemic rest pain, ischemic ulcers, and/or gangrene) in patients who present within two weeks of the acute event  Viable limbs: are under no immediate threat of tissue loss. There is no sensory loss or muscle weakness and both arterial and venous Doppler signals are audible.  Left: Asymptomatic:  documented peripheral arterial disease without symptoms of claudication or ischemic pain      Treatment of Native Artery to Maintain Bypass Patency?:  No    COVID Information  COVID Symptoms Pre-Procedure: Asymptomatic    Treatment Delayed by Pandemic: None    Access   Number of Sites: 1     Access Site 1:     Side 1: Left    Site 1: Femoral Retrograde    Access Guidance 1:U/S    Largest Sheath Size 1: 7 Fr.    Closure Device 1: StarClose      Number of Closure Devices: 1     Closure Device Outcome: Closure device successful         Procedure  Fluoro Time: 12.3 minutes  Contrast Volume: Visipaque 60 ml  DAP: 37.3 Gy.cm2  CO2: no  Anticoagulant: Heparin  Protamine: No  If Creatinine is > 1.2 or missing, ABIODUN Prophylaxis none     Treatment Details  Indication: Occlusive Disease,    Completion Assessment  Artery 1 treated: Popliteal   Right               Outflow: AT,PT,Peroneal: 3             Segments treated: P1                       Was this Site previously treated?: No          TASC Grade: A          Total Treated Length: 6cm          Total  Occluded Length: 0 cm          Calcification: None (no calcification visible on fluoroscopic, CT or IVUS imaging)          Number of Treatment types (Devices):   2           Device 1          Treatment Type: Special Balloon,  Drug Coated Balloon                Diameter: 5 mm          Length: 60 mm         Device 2          Treatment Type:           Concomitant: Suction Thrombectomy   Planned, Current Procedure          Technical result: Successful (stenosis <=30%)      None     Post Procedure  Patient currently taking: Statin, Yes      Antiplatelet Medication, Yes    Procedure Complications: No            SIGNATURE: Scooby Rodriguez DO  DATE: May 20, 2024  TIME: 4:48 PM

## 2024-05-20 NOTE — TELEMEDICINE
e-Consult (IPC)  - Interventional Radiology  Taj Mendes 44 y.o. male MRN: [id number]  Unit/Bed#: [facility] 523-01 Encounter: [id number]          Interventional Radiology has been consulted to evaluate Taj Mendes      Inpatient Consult to IR  Consult performed by: Bertha Salamanca MD  Consult ordered by: Meghan Banegas PA-C        05/19/24    Assessment/Recommendation:   Pt presented with right calf pain x 2 weeks.    Hx of diabetes and smoking.    CT shows right pop occlusion, embolus or in-situ thrombosis.    IR consulted for angio/thrombectomy/thrombolysis.    I discussed with Dr. Jose Eaton.    Plan to do tmr.    Order placed.  NPO order placed.     21-30 minutes, >50% of the total time devoted to medical consultative verbal/EMR discussion between providers. Written report will be generated in the EMR.     Thank you for allowing Interventional Radiology to participate in the care of Taj Mendes. Please don't hesitate to call or TigerText us with any questions.     Bertha Salamanca MD

## 2024-05-20 NOTE — ANESTHESIA POSTPROCEDURE EVALUATION
Post-Op Assessment Note    CV Status:  Stable    Pain management: adequate       Mental Status:  Alert and awake   Hydration Status:  Euvolemic   PONV Controlled:  Controlled   Airway Patency:  Patent     Post Op Vitals Reviewed: Yes    No anethesia notable event occurred.    Staff: CRNA               BP   115/73   Temp (!) 96.2 °F (35.7 °C) (05/20/24 1632)    Pulse 75 (05/20/24 1632)   Resp   17   SpO2   99

## 2024-05-20 NOTE — ASSESSMENT & PLAN NOTE
45 y/o M w/ PMHx of DM2 and tobacco abuse who presented to the ED w/ a 2-week h/o progressive RLE pain starting in his right calf, initially claudication but has progressed to rest pain.     5/19 CTA- Segmental occlusion (6 cm in length) of the above-knee right popliteal artery. Differential considerations include thromboembolism and in situ thrombosis.     Plan:  IR consult for Right Agram, Pop thrombectomy, possible lysis  Continue Heparin gtt, transition to DOAC for discharge, will check cost  Cardiology consultation for evaluation to r/o embolic source  Tobacco cessation

## 2024-05-20 NOTE — UTILIZATION REVIEW
Initial Clinical Review    Admission: Date/Time/Statement:   Admission Orders (From admission, onward)       Ordered        05/19/24 1933  INPATIENT ADMISSION  Once                          Orders Placed This Encounter   Procedures    INPATIENT ADMISSION     Standing Status:   Standing     Number of Occurrences:   1     Order Specific Question:   Level of Care     Answer:   Level 2 Stepdown / HOT [14]     Order Specific Question:   Estimated length of stay     Answer:   More than 2 Midnights     Order Specific Question:   Certification     Answer:   I certify that inpatient services are medically necessary for this patient for a duration of greater than two midnights. See H&P and MD Progress Notes for additional information about the patient's course of treatment.     ED Arrival Information       Patient not seen in ED                       No chief complaint on file.      Initial Presentation: 44 y.o. male w/ PMH of DM2 and tobacco abuse was transferred from [facility] to [facility] for a higher level of care.  Pt initially presented to [facility] w/ 2-week h/o progressive RLE pain starting in his right calf.  Pain is worse with walking and cramping in nature mostly in posterior calf.  Today patient noticed increased pain at rest with numbness and some burning over his right forefoot prompting him to come to ED for evaluation. LEAD 05/19 showed RLE with occlusion of the distal SFA with minimal distal reconstitution. On exam, ROM intact, mild decreased sensation over forefoot, no ischemic changes, foot is cool to touch. RLE with 1+ palpable femoral pulse, no palpable distal pulses, unable to get doppler signals over PT or DP.  Transferred to \[facility]\"" for Vascular Surg eval.  On arrival to \[facility]\"", pt alert, c/o R leg pain, extremities normal, atraumatic, no cyanosis or edema.    Admit as Inpatient for evaluation and treatment of R popliteal artery occlusion.  Plan:  Heparin gtt, pain medicine, IR consult, NV checks, compartment  checks, NPO after MN, tobacco cessation, cardiology consult, EKG, echo     IR tele consult: CT shows right pop occlusion, embolus or in-situ thrombosis: Plan for TMR. Ordered NPO.     Date: 05/20   Day 2: M/S remains intact, no events overnight.   5/19 CTA- Segmental occlusion (6 cm in length) of the above-knee right popliteal artery. Differential considerations include thromboembolism and in situ thrombosis.    Plan: IR consulted for Right Agram, Pop thrombectomy, possible lysis. Continue Heparin gtt, transition to DOAC for discharge. Cardiology consultation for evaluation to r/o embolic source. Tobacco cessation    OP Note:  SURGERY DATE: 5/20/2024   Procedure(s):  #1 ultrasound-guided percutaneous access to the left common femoral artery  #2 aortogram  #3 iliac angiogram  #4 catheter placement in the right popliteal artery  #5 right lower extremity angiogram  #6 mechanical thrombectomy using CAT 7 penumbra device of the right popliteal artery  #7 drug-coated balloon angioplasty of the P1 P2 popliteal artery using a 5mm x 6cm [place] balloon  #8 supervision and interpretation of all radiologic imaging  #9 intra-arterial administration 400 mcg of nitro  Anesthesia Type:   General endotracheal  Operative Findings:  Patent infrarenal aorta and single renal arteries bilaterally patent iliac vessels bilaterally on the left side the common femoral was patent as was the profunda as was the proximal left SFA.  The right common femoral was patent as was the right profunda femoris the right SFA was patent there was a mild proximal SFA stenosis the remainder of the SFA was widely patent there is occlusion of the P1 P2 popliteal segment which was suspicious for embolic phenomenon the P3 popliteal was patent and there is three-vessel runoff after initial mechanical thrombectomy there was some approximately 50% residual stenosis in the P1 pop this was ballooned with a 5 mm [place] with an improved result there is still  approximately 30% residual stenosis which I elected not to treat further.  The patient had a palpable right dorsalis pedis pulse at the end of the procedure    ED Triage Vitals   Temperature Pulse Respirations Blood Pressure SpO2   05/19/24 1948 05/19/24 1948 05/19/24 1948 05/19/24 1948 05/19/24 1948   98.4 °F (36.9 °C) 77 20 144/75 99 %      Temp Source Heart Rate Source Patient Position - Orthostatic VS BP Location FiO2 (%)   05/19/24 1948 -- 05/19/24 1948 05/19/24 1948 --   Oral  Lying Right arm       Pain Score       05/19/24 1945       4          Wt Readings from Last 1 Encounters:   05/19/24 113 kg (248 lb 10.9 oz)     Additional Vital Signs:   Date/Time Temp Pulse Resp BP MAP (mmHg) SpO2 O2 Device Patient Position - Orthostatic VS   05/20/24 02:17:17 98.3 °F (36.8 °C) 71 20 146/80 106 99 % -- Lying   05/20/24 0100 -- -- -- 149/77 103 -- -- --   05/19/24 22:15:54 97.9 °F (36.6 °C) -- 20 135/74 -- 98 % -- --     Pertinent Labs/Diagnostic Test Results:   IR lower extremity angiogram    (Results Pending)   CT head wo contrast    (Results Pending)     05/20 EKG result: Normal sinus rhythm  Non-specific intra-ventricular conduction delay        Results from last 7 days   Lab Units 05/20/24 0449 05/19/24 2006 05/19/24  1206   WBC Thousand/uL 9.17 10.38* 10.15   HEMOGLOBIN g/dL 15.3 14.7 15.0   HEMATOCRIT % 47.2 44.8 45.5   PLATELETS Thousands/uL 272 252 268   TOTAL NEUT ABS Thousands/µL 4.54  --  5.69         Results from last 7 days   Lab Units 05/20/24 0449 05/19/24  1206   SODIUM mmol/L 140 138   POTASSIUM mmol/L 3.9 3.8   CHLORIDE mmol/L 104 105   CO2 mmol/L 26 25   ANION GAP mmol/L 10 8   BUN mg/dL 13 14   CREATININE mg/dL 0.71 0.79   EGFR ml/min/1.73sq m 115 110   CALCIUM mg/dL 9.0 9.1   MAGNESIUM mg/dL 2.0 2.0   PHOSPHORUS mg/dL 3.7  --      Results from last 7 days   Lab Units 05/19/24  1206   AST U/L 22   ALT U/L 36   ALK PHOS U/L 68   TOTAL PROTEIN g/dL 7.5   ALBUMIN g/dL 4.4   TOTAL BILIRUBIN mg/dL  0.35         Results from last 7 days   Lab Units 05/20/24  0449 05/19/24  1206   GLUCOSE RANDOM mg/dL 82 114             Results from last 7 days   Lab Units 05/19/24  1206   CK TOTAL U/L 147         Results from last 7 days   Lab Units 05/19/24  1206   D-DIMER QUANTITATIVE ug/ml FEU 0.30     Results from last 7 days   Lab Units 05/20/24  0449 05/19/24 2006 05/19/24  1623   PROTIME seconds  --  13.8 13.4   INR   --  1.07 0.98   PTT seconds 68* 182* 31             ED Treatment:   Medication Administration - No Administrations Displayed (No Start Event Found)       None          Past Medical History:   Diagnosis Date    Prediabetes      Present on Admission:   Popliteal artery occlusion, right ([facility])      Admitting Diagnosis: PAD (peripheral artery disease) ([facility])  Age/Sex: 44 y.o. male  Admission Orders:  SCD  NV checks    Scheduled Medications:  aspirin, 325 mg, Oral, Daily  atorvastatin, 20 mg, Oral, Daily With Dinner  chlorhexidine, 15 mL, Swish & Spit, Once  nicotine, 1 patch, Transdermal, Daily      Continuous IV Infusions:  heparin (porcine), 3-30 Units/kg/hr (Order-Specific), Intravenous, Titrated  sodium chloride, 125 mL/hr, Intravenous, Continuous      PRN Meds:  acetaminophen, 650 mg, Oral, Q4H PRN  heparin (porcine), 4,400 Units, Intravenous, Q6H PRN  heparin (porcine), 8,800 Units, Intravenous, Q6H PRN  HYDROmorphone, 0.5 mg, Intravenous, Q2H PRN  ondansetron, 4 mg, Intravenous, Q4H PRN  oxyCODONE, 5 mg, Oral, Q4H PRN   Or  oxyCODONE, 10 mg, Oral, Q4H PRN        IP CONSULT TO CARDIOLOGY  INPATIENT CONSULT TO IR    Network Utilization Review Department  ATTENTION: Please call with any questions or concerns to [phone] and carefully listen to the prompts so that you are directed to the right person. All voicemails are confidential.   For Discharge needs, contact Care Management DC Support Team at [phone] opt. 2  Send all requests for admission clinical reviews, approved or denied determinations and  any other requests to dedicated fax number below belonging to the campus where the patient is receiving treatment. List of dedicated fax numbers for the Facilities:  FACILITY NAME UR FAX NUMBER   ADMISSION DENIALS (Administrative/Medical Necessity) [phone]   DISCHARGE SUPPORT TEAM (NETWORK) [phone]   PARENT CHILD HEALTH (Maternity/NICU/Pediatrics) [phone]   [facility] [phone]   [facility] [phone]   [facility] [phone]   [facility] [phone]   [facility] [phone]   [facility] [phone]   [facility] [phone]   [facility] [phone]   [facility] [phone]   [facility] [phone]   [facility] [phone]   [facility] [phone]

## 2024-05-20 NOTE — PROGRESS NOTES
"[facility]  Progress Note  Name: Taj Mendes I  MRN: [id number]  Unit/Bed#: [facility] 523-01 I Date of Admission: 5/19/2024   Date of Service: 5/20/2024 I Hospital Day: 1    Assessment & Plan   * Popliteal artery occlusion, right (HCC)  Assessment & Plan   45 y/o M w/ PMHx of DM2 and tobacco abuse who presented to the ED w/ a 2-week h/o progressive RLE pain starting in his right calf, initially claudication but has progressed to rest pain.     5/19 CTA- Segmental occlusion (6 cm in length) of the above-knee right popliteal artery. Differential considerations include thromboembolism and in situ thrombosis.     Plan:  IR consult for Right Agram, Pop thrombectomy, possible lysis  Continue Heparin gtt, transition to DOAC for discharge, will check cost  Cardiology consultation for evaluation to r/o embolic source  Tobacco cessation               Subjective:  M/S remains intact, no events overnight    Vitals:  /80 (BP Location: Right arm)   Pulse 71   Temp 98.3 °F (36.8 °C) (Oral)   Resp 20   Ht 6' 2\" (1.88 m)   Wt 113 kg (248 lb 10.9 oz)   SpO2 99%   BMI 31.93 kg/m²     I/Os:  I/O last 3 completed shifts:  In: 106.8 [I.V.:106.8]  Out: 1525 [Urine:1525]  No intake/output data recorded.    Lab Results and Cultures:   Lab Results   Component Value Date    WBC 9.17 05/20/2024    HGB 15.3 05/20/2024    HCT 47.2 05/20/2024    MCV 90 05/20/2024     05/20/2024     Lab Results   Component Value Date    GLUCOSE 150 (H) 08/22/2022    CALCIUM 9.0 05/20/2024    K 3.9 05/20/2024    CO2 26 05/20/2024     05/20/2024    BUN 13 05/20/2024    CREATININE 0.71 05/20/2024     Lab Results   Component Value Date    INR 1.07 05/19/2024    INR 0.98 05/19/2024    PROTIME 13.8 05/19/2024    PROTIME 13.4 05/19/2024        Blood Culture: No results found for: \"BLOODCX\",   Urinalysis: No results found for: \"COLORU\", \"CLARITYU\", \"SPECGRAV\", \"PHUR\", \"LEUKOCYTESUR\", \"NITRITE\", " "\"PROTEINUA\", \"GLUCOSEU\", \"KETONESU\", \"BILIRUBINUR\", \"BLOODU\",   Urine Culture: No results found for: \"URINECX\",   Wound Culure: No results found for: \"WOUNDCULT\"    Medications:  Current Facility-Administered Medications   Medication Dose Route Frequency    acetaminophen (TYLENOL) tablet 650 mg  650 mg Oral Q4H PRN    aspirin tablet 325 mg  325 mg Oral Daily    atorvastatin (LIPITOR) tablet 20 mg  20 mg Oral Daily With Dinner    heparin (porcine) 25,000 units in 0.45% NaCl 250 mL infusion (premix)  3-30 Units/kg/hr (Order-Specific) Intravenous Titrated    heparin (porcine) injection 4,400 Units  4,400 Units Intravenous Q6H PRN    heparin (porcine) injection 8,800 Units  8,800 Units Intravenous Q6H PRN    HYDROmorphone (DILAUDID) injection 0.5 mg  0.5 mg Intravenous Q2H PRN    nicotine (NICODERM CQ) 21 mg/24 hr TD 24 hr patch 1 patch  1 patch Transdermal Daily    ondansetron (ZOFRAN) injection 4 mg  4 mg Intravenous Q4H PRN    oxyCODONE (ROXICODONE) IR tablet 5 mg  5 mg Oral Q4H PRN    Or    oxyCODONE (ROXICODONE) immediate release tablet 10 mg  10 mg Oral Q4H PRN    sodium chloride 0.9 % infusion  125 mL/hr Intravenous Continuous         Physical Exam:    General appearance: alert and oriented, in no acute distress  Lungs: clear to auscultation bilaterally  Heart: regular rate and rhythm, S1, S2 normal, no murmur, click, rub or gallop  Abdomen: soft, non-tender; bowel sounds normal; no masses,  no organomegaly  Extremities:  M/S intact      Pulse exam:  Femoral: Right: 2+ Left: 2+  DP: Right: non-palpable Left: 2+  PT: Right: non-palpable Left: 2+      Rochelle Carcamo PA-C  5/20/2024      "

## 2024-05-21 ENCOUNTER — APPOINTMENT (INPATIENT)
Dept: RADIOLOGY | Facility: HOSPITAL | Age: 44
DRG: 254 | End: 2024-05-21
Payer: COMMERCIAL

## 2024-05-21 ENCOUNTER — TELEPHONE (OUTPATIENT)
Dept: CARDIOLOGY CLINIC | Facility: CLINIC | Age: 44
End: 2024-05-21

## 2024-05-21 ENCOUNTER — TELEPHONE (OUTPATIENT)
Dept: VASCULAR SURGERY | Facility: CLINIC | Age: 44
End: 2024-05-21

## 2024-05-21 VITALS
RESPIRATION RATE: 18 BRPM | HEART RATE: 84 BPM | HEIGHT: 74 IN | SYSTOLIC BLOOD PRESSURE: 147 MMHG | TEMPERATURE: 98.2 F | DIASTOLIC BLOOD PRESSURE: 66 MMHG | WEIGHT: 248 LBS | BODY MASS INDEX: 31.83 KG/M2 | OXYGEN SATURATION: 100 %

## 2024-05-21 DIAGNOSIS — F17.200 SMOKING: Primary | ICD-10-CM

## 2024-05-21 DIAGNOSIS — R91.8 HILAR MASS: ICD-10-CM

## 2024-05-21 LAB
ANION GAP SERPL CALCULATED.3IONS-SCNC: 8 MMOL/L (ref 4–13)
APTT PPP: 39 SECONDS (ref 23–37)
BUN SERPL-MCNC: 14 MG/DL (ref 5–25)
CALCIUM SERPL-MCNC: 7.9 MG/DL (ref 8.4–10.2)
CHLORIDE SERPL-SCNC: 105 MMOL/L (ref 96–108)
CO2 SERPL-SCNC: 26 MMOL/L (ref 21–32)
CREAT SERPL-MCNC: 0.75 MG/DL (ref 0.6–1.3)
ERYTHROCYTE [DISTWIDTH] IN BLOOD BY AUTOMATED COUNT: 13.8 % (ref 11.6–15.1)
GFR SERPL CREATININE-BSD FRML MDRD: 113 ML/MIN/1.73SQ M
GLUCOSE SERPL-MCNC: 121 MG/DL (ref 65–140)
HCT VFR BLD AUTO: 40.6 % (ref 36.5–49.3)
HGB BLD-MCNC: 13.3 G/DL (ref 12–17)
MCH RBC QN AUTO: 29.3 PG (ref 26.8–34.3)
MCHC RBC AUTO-ENTMCNC: 32.8 G/DL (ref 31.4–37.4)
MCV RBC AUTO: 89 FL (ref 82–98)
PLATELET # BLD AUTO: 223 THOUSANDS/UL (ref 149–390)
PMV BLD AUTO: 9.1 FL (ref 8.9–12.7)
POTASSIUM SERPL-SCNC: 3.8 MMOL/L (ref 3.5–5.3)
RBC # BLD AUTO: 4.54 MILLION/UL (ref 3.88–5.62)
SODIUM SERPL-SCNC: 139 MMOL/L (ref 135–147)
WBC # BLD AUTO: 9.52 THOUSAND/UL (ref 4.31–10.16)

## 2024-05-21 PROCEDURE — NC001 PR NO CHARGE: Performed by: SURGERY

## 2024-05-21 PROCEDURE — 80048 BASIC METABOLIC PNL TOTAL CA: CPT | Performed by: HOSPITALIST

## 2024-05-21 PROCEDURE — 71275 CT ANGIOGRAPHY CHEST: CPT

## 2024-05-21 PROCEDURE — 99232 SBSQ HOSP IP/OBS MODERATE 35: CPT | Performed by: INTERNAL MEDICINE

## 2024-05-21 PROCEDURE — 85730 THROMBOPLASTIN TIME PARTIAL: CPT

## 2024-05-21 PROCEDURE — 99238 HOSP IP/OBS DSCHRG MGMT 30/<: CPT | Performed by: PHYSICIAN ASSISTANT

## 2024-05-21 PROCEDURE — 85027 COMPLETE CBC AUTOMATED: CPT | Performed by: HOSPITALIST

## 2024-05-21 RX ORDER — NICOTINE 21 MG/24HR
1 PATCH, TRANSDERMAL 24 HOURS TRANSDERMAL DAILY
Qty: 28 PATCH | Refills: 0 | Status: SHIPPED | OUTPATIENT
Start: 2024-05-22

## 2024-05-21 RX ORDER — ACETAMINOPHEN 325 MG/1
650 TABLET ORAL EVERY 4 HOURS PRN
COMMUNITY
Start: 2024-05-21

## 2024-05-21 RX ORDER — ATORVASTATIN CALCIUM 20 MG/1
20 TABLET, FILM COATED ORAL
Qty: 30 TABLET | Refills: 6 | Status: SHIPPED | OUTPATIENT
Start: 2024-05-21

## 2024-05-21 RX ADMIN — ASPIRIN 325 MG ORAL TABLET 325 MG: 325 PILL ORAL at 08:32

## 2024-05-21 RX ADMIN — IOHEXOL 85 ML: 350 INJECTION, SOLUTION INTRAVENOUS at 10:19

## 2024-05-21 RX ADMIN — RIVAROXABAN 15 MG: 15 TABLET, FILM COATED ORAL at 08:32

## 2024-05-21 RX ADMIN — HEPARIN SODIUM 4000 UNITS: 1000 INJECTION INTRAVENOUS; SUBCUTANEOUS at 03:29

## 2024-05-21 NOTE — PROGRESS NOTES
"[facility]  Progress Note  Name: Taj Mendes I  MRN: [id number]  Unit/Bed#: [facility] 523-01 I Date of Admission: 5/19/2024   Date of Service: 5/21/2024 I Hospital Day: 2    Assessment & Plan   * Popliteal artery occlusion, right (HCC)  Assessment & Plan   43 y/o M w/ PMHx of DM2 and tobacco abuse who presented to the ED w/ a 2-week h/o progressive RLE pain starting in his right calf, initially claudication but has progressed to rest pain.     5/19 CTA- Segmental occlusion (6 cm in length) of the above-knee right popliteal artery. Differential considerations include thromboembolism and in situ thrombosis.     S/P RLE Agram, suction thrombectomy, AK pop PTA 5/20    Plan:  Continue Heparin gtt, transition to Xarelto  Obtain CTA chest to r/o embolic source  Cardiology consultation for evaluation to r/o embolic source; ECHO complete; outpatient Zio patch  Tobacco cessation  Possible discharge home today             Subjective:  Pt doing well, feels better right leg symptoms improved    Vitals:  /67   Pulse 78   Temp 98.1 °F (36.7 °C) (Oral)   Resp 16   Ht 6' 2\" (1.88 m)   Wt 112 kg (248 lb)   SpO2 100%   BMI 31.84 kg/m²     I/Os:  I/O last 3 completed shifts:  In: 2951.6 [P.O.:180; I.V.:2721.6; IV Piggyback:50]  Out: 3150 [Urine:3150]  No intake/output data recorded.    Lab Results and Cultures:   Lab Results   Component Value Date    WBC 9.52 05/21/2024    HGB 13.3 05/21/2024    HCT 40.6 05/21/2024    MCV 89 05/21/2024     05/21/2024     Lab Results   Component Value Date    GLUCOSE 150 (H) 08/22/2022    CALCIUM 7.9 (L) 05/21/2024    K 3.8 05/21/2024    CO2 26 05/21/2024     05/21/2024    BUN 14 05/21/2024    CREATININE 0.75 05/21/2024     Lab Results   Component Value Date    INR 1.10 05/20/2024    INR 1.07 05/19/2024    INR 0.98 05/19/2024    PROTIME 14.1 05/20/2024    PROTIME 13.8 05/19/2024    PROTIME 13.4 05/19/2024        Blood Culture: No " "results found for: \"BLOODCX\",   Urinalysis: No results found for: \"COLORU\", \"CLARITYU\", \"SPECGRAV\", \"PHUR\", \"LEUKOCYTESUR\", \"NITRITE\", \"PROTEINUA\", \"GLUCOSEU\", \"KETONESU\", \"BILIRUBINUR\", \"BLOODU\",   Urine Culture: No results found for: \"URINECX\",   Wound Culure: No results found for: \"WOUNDCULT\"    Medications:  Current Facility-Administered Medications   Medication Dose Route Frequency    acetaminophen (TYLENOL) tablet 650 mg  650 mg Oral Q4H PRN    aspirin tablet 325 mg  325 mg Oral Daily    atorvastatin (LIPITOR) tablet 20 mg  20 mg Oral Daily With Dinner    heparin (porcine) 25,000 units in 0.45% NaCl 250 mL infusion (premix)  3-20 Units/kg/hr (Order-Specific) Intravenous Titrated    heparin (porcine) injection 2,000 Units  2,000 Units Intravenous Q6H PRN    heparin (porcine) injection 4,000 Units  4,000 Units Intravenous Q6H PRN    HYDROmorphone (DILAUDID) injection 0.5 mg  0.5 mg Intravenous Q2H PRN    lactated ringers bolus 500 mL  500 mL Intravenous Once PRN    And    lactated ringers bolus 500 mL  500 mL Intravenous Once PRN    nicotine (NICODERM CQ) 21 mg/24 hr TD 24 hr patch 1 patch  1 patch Transdermal Daily    ondansetron (ZOFRAN) injection 4 mg  4 mg Intravenous Q4H PRN    oxyCODONE (ROXICODONE) IR tablet 5 mg  5 mg Oral Q4H PRN    Or    oxyCODONE (ROXICODONE) immediate release tablet 10 mg  10 mg Oral Q4H PRN    sodium chloride 0.9 % bolus 500 mL  500 mL Intravenous Once PRN    And    sodium chloride 0.9 % bolus 500 mL  500 mL Intravenous Once PRN    sodium chloride 0.9 % infusion  50 mL/hr Intravenous Continuous       Physical Exam:    General appearance: alert and oriented, in no acute distress  Lungs: clear to auscultation bilaterally  Heart: S1, S2 normal  Abdomen: soft, non-tender; bowel sounds normal; no masses,  no organomegaly  Extremities: extremities normal, warm and well-perfused; no cyanosis, clubbing, or edema    Wound/Incision:  Groin puncture incision clean, dry, and intact    Pulse " exam:  DP: Right: 2+ Left: 2+  PT: Right: 2+ Left: 2+      Rochelle Carcamo PA-C  5/21/2024

## 2024-05-21 NOTE — TELEPHONE ENCOUNTER
No prior auth required. 7 day Zio XT ordered and sent to patients home.     Reference #: [id number]

## 2024-05-21 NOTE — DISCHARGE INSTR - AVS FIRST PAGE
DISCHARGE INSTRUCTIONS  ARTERIOGRAM/ANGIOPLASTY/STENT    ACTIVITY: On the evening following the procedure, you should be mostly resting.  Someone should remain with you during the evening and overnight following the procedure.     On the day after your procedure, limit your activity to walking.  Avoid heavy lifting (no more than 15 lbs) for the first three days. Walking up steps and normal activities may be resumed as you feel ready.   You should not drive a car for at least two days following discharge from the hospital. You may ride in a car.   If you have any questions regarding a particular activity, please discuss with your doctor or nurse before you are discharged.    DIET:  Resume your normal diet.  Drink more water than usual for the next 24 hours.    PROCEDURE SITE: You may have a procedure site in your groin, arm, or foot.  You may have surgical glue at your procedure site.  The glue is used to cover the procedure site, assist in closure, and prevent contamination. This adhesive will darken and peel away on its own within one to two weeks. Do not pick at it.    You should shower daily.  Wash incision daily with soap and water, but do not rub or scrub the incision; rinse thoroughly and pat dry.  Do not bathe in a tub or swim for the first 2 week following your procedure or if you have any open wounds.  It is normal to have some bruising, swelling or discoloration around the procedure site.  IF increasing redness, pain, or a bulge develops, call our office immediately.    If present, you may remove the band-aid or “steri-strips” over your procedure site after two days.   If you notice any active bleeding at the site, apply pressure to the site and call our office ([phone]) or [phone].    FOLLOW UP STUDIES:  Your doctor will discuss whether further treatments or follow-up studies are necessary at your first post procedure visit.    FOLLOW UP APPOINTMENTS:  Making and keeping follow up appointments and  ultrasound tests are important to your recovery.  If you have difficulty making it to or keeping your follow up appointments, call the office.    If you have increased pain, fever >101.5, increased drainage, redness or a bad smell at your surgery site, new coldness/numbness of your arm or leg, please call us immediately and GO directly to the ER.    PLEASE CALL THE OFFICE IF YOU HAVE ANY QUESTIONS  [phone]  [phone] [address], [address], [place] [address]  [address] [facility], [address], [place] [address]  [address] [place] [address]  [address] [place] [address], [address], [place] [address]  [address] [facility], [place] [address]  [address] [facility], 2nd Floor, [address], [place] [address]  [address] [facility], [address], [place] [address]  [address] [facility], 1st Floor, [address], [place] [address]  [address] [facility], [place] [address]  [address] [address]

## 2024-05-21 NOTE — PROGRESS NOTES
General Cardiology   Progress Note -  [facility] [employer]   Taj Mendes 44 y.o. male MRN: [id number]    Unit/Bed#: [facility] 523-01 Encounter: [id number]    Assessment:    1.  Right popliteal artery occlusion: Presented with 2 weeks of progressive RLE pain in the right calf with initial claudication that progressed to rest pain.  LEADS revealed right distal SFA occlusion with NELSON of 0.17.  CTA revealed occlusion of the above-knee right popliteal artery.    S/p RLE Agram, suction thrombectomy, AK pop PTA on 5/20  Currently on aspirin, statin and Xarelto  Telemetry unremarkable     2.  Preserved biventricular systolic function: EF 55% with no WMA, normal RV function, no PFO, no significant valvular abnormality.    3.  Type II DM: No A1c on file.  Maintained on Ozempic.     4.  Tobacco abuse: Complete smoking cessation advised     Plan/Recommendations:  Plan for outpatient ZIO monitor and eventual loop implantation  No further recommendations from a cardiac standpoint  Follow-up with advanced practitioner from our office in 6 weeks.  Our office will contact patient with an appointment.  ______________________________________________________________________    Subjective    Patient seen and examined.  No acute events overnight.    Review of Systems   Constitutional: Negative. Negative for chills.   Cardiovascular:  Negative for chest pain, dyspnea on exertion, leg swelling, near-syncope, orthopnea, palpitations, paroxysmal nocturnal dyspnea and syncope.   Respiratory: Negative.  Negative for cough, shortness of breath and wheezing.    Endocrine: Negative.    Hematologic/Lymphatic: Negative.    Skin: Negative.    Musculoskeletal: Negative.    Gastrointestinal: Negative.  Negative for diarrhea, nausea and vomiting.   Neurological:  Negative for dizziness, light-headedness and weakness.   Psychiatric/Behavioral: Negative.  Negative for altered mental status.    All other systems reviewed and are negative.      Objective:  "  Vitals: Blood pressure 147/66, pulse 84, temperature 98.2 °F (36.8 °C), temperature source Oral, resp. rate 18, height 6' 2\" (1.88 m), weight 112 kg (248 lb), SpO2 100%.,     Wt Readings from Last 3 Encounters:   24 112 kg (248 lb)   24 113 kg (248 lb 14.4 oz)   22 122 kg (269 lb)        Lab Results   Component Value Date    CREATININE 0.75 2024    CREATININE 0.71 2024    CREATININE 0.79 2024         Body mass index is 31.84 kg/m².,     Systolic (24hrs), Av , Min:112 , Max:147     Diastolic (24hrs), Av, Min:58, Max:80          Intake/Output Summary (Last 24 hours) at 2024 0900  Last data filed at 2024 0829  Gross per 24 hour   Intake 2569.67 ml   Output 2625 ml   Net -55.33 ml     Weight (last 2 days)       Date/Time Weight    24 1147 112 (248)    24 1900 113 (248.68)          Telemetry Review: No significant arrhythmias seen on telemetry review.       Physical Exam  Vitals and nursing note reviewed.   Constitutional:       General: He is not in acute distress.     Appearance: He is well-developed. He is obese.      Comments: On RA in NAD   HENT:      Head: Normocephalic and atraumatic.   Neck:      Vascular: No JVD.   Cardiovascular:      Rate and Rhythm: Normal rate and regular rhythm.      Heart sounds: Normal heart sounds. No murmur heard.     No friction rub. No gallop.   Pulmonary:      Effort: Pulmonary effort is normal. No respiratory distress.      Breath sounds: Normal breath sounds. No wheezing or rales.   Chest:      Chest wall: No tenderness.   Abdominal:      General: Bowel sounds are normal. There is no distension.      Palpations: Abdomen is soft.      Tenderness: There is no abdominal tenderness.   Musculoskeletal:         General: No tenderness. Normal range of motion.      Cervical back: Normal range of motion and neck supple.      Right lower leg: No edema.      Left lower leg: No edema.   Skin:     General: Skin is warm and " "dry.      Coloration: Skin is not pale.      Findings: No erythema.   Neurological:      Mental Status: He is alert and oriented to person, place, and time.   Psychiatric:         Mood and Affect: Mood normal.         Behavior: Behavior normal.         Thought Content: Thought content normal.         Judgment: Judgment normal.         LABORATORY RESULTS  Results from last 7 days   Lab Units 05/19/24  1206   CK TOTAL U/L 147     CBC with diff:   Results from last 7 days   Lab Units 05/21/24 0238 05/20/24 0449 05/19/24 2006 05/19/24  1206   WBC Thousand/uL 9.52 9.17 10.38* 10.15   HEMOGLOBIN g/dL 13.3 15.3 14.7 15.0   HEMATOCRIT % 40.6 47.2 44.8 45.5   MCV fL 89 90 89 88   PLATELETS Thousands/uL 223 272 252 268   RBC Million/uL 4.54 5.24 5.05 5.17   MCH pg 29.3 29.2 29.1 29.0   MCHC g/dL 32.8 32.4 32.8 33.0   RDW % 13.8 13.9 14.0 13.6   MPV fL 9.1 9.2 8.9 9.1   NRBC AUTO /100 WBCs  --  0  --  0       CMP:  Results from last 7 days   Lab Units 05/21/24 0238 05/20/24 0449 05/19/24  1206   POTASSIUM mmol/L 3.8 3.9 3.8   CHLORIDE mmol/L 105 104 105   CO2 mmol/L 26 26 25   BUN mg/dL 14 13 14   CREATININE mg/dL 0.75 0.71 0.79   CALCIUM mg/dL 7.9* 9.0 9.1   AST U/L  --   --  22   ALT U/L  --   --  36   ALK PHOS U/L  --   --  68   EGFR ml/min/1.73sq m 113 115 110       BMP:  Results from last 7 days   Lab Units 05/21/24 0238 05/20/24 0449 05/19/24  1206   POTASSIUM mmol/L 3.8 3.9 3.8   CHLORIDE mmol/L 105 104 105   CO2 mmol/L 26 26 25   BUN mg/dL 14 13 14   CREATININE mg/dL 0.75 0.71 0.79   CALCIUM mg/dL 7.9* 9.0 9.1       No results found for: \"NTBNP\"          Results from last 7 days   Lab Units 05/20/24  0449 05/19/24  1206   MAGNESIUM mg/dL 2.0 2.0                     Results from last 7 days   Lab Units 05/20/24  1710 05/19/24 2006 05/19/24  1623   INR  1.10 1.07 0.98       Lipid Profile:   No results found for: \"CHOL\"  Lab Results   Component Value Date    HDL 28 (L) 08/22/2022     Lab Results   Component Value " Date    LDLCALC  08/22/2022      Comment:      Calculated LDL invalid, triglycerides >400 mg/dl  This screening LDL is a calculated result.   It does not have the accuracy of the Direct Measured LDL in the monitoring of patients with hyperlipidemia and/or statin therapy.   Direct Measure LDL (EIY710) must be ordered separately in these patients.     Lab Results   Component Value Date    TRIG 1,282 (H) 08/22/2022       Cardiac testing:   No results found for this or any previous visit.    No results found for this or any previous visit.    No results found for this or any previous visit.    No valid procedures specified.  No results found for this or any previous visit.        Meds/Allergies   all current active meds have been reviewed, current meds:   Current Facility-Administered Medications   Medication Dose Route Frequency    acetaminophen (TYLENOL) tablet 650 mg  650 mg Oral Q4H PRN    aspirin tablet 325 mg  325 mg Oral Daily    atorvastatin (LIPITOR) tablet 20 mg  20 mg Oral Daily With Dinner    nicotine (NICODERM CQ) 21 mg/24 hr TD 24 hr patch 1 patch  1 patch Transdermal Daily    ondansetron (ZOFRAN) injection 4 mg  4 mg Intravenous Q4H PRN    oxyCODONE (ROXICODONE) IR tablet 5 mg  5 mg Oral Q4H PRN    Or    oxyCODONE (ROXICODONE) immediate release tablet 10 mg  10 mg Oral Q4H PRN    rivaroxaban (XARELTO) tablet 15 mg  15 mg Oral Q12H ODALYS   , and PTA meds:   Prior to Admission Medications   Prescriptions Last Dose Informant Patient Reported? Taking?   Armodafinil 250 MG tablet 5/18/2024  Yes Yes   Sig: Take 250 mg by mouth daily as needed In the morning   Ozempic, 2 MG/DOSE, 8 MG/3ML injection pen   Yes Yes   Sig: Inject 2 mg under the skin every 7 days   TiZANidine (ZANAFLEX) 4 MG capsule 5/18/2024  Yes Yes   Sig: Take 4 mg by mouth 3 (three) times a day   naproxen (EC NAPROSYN) 500 MG EC tablet   No No   Sig: Take 1 tablet (500 mg total) by mouth 2 (two) times a day with meals for 7 days       Facility-Administered Medications: None       Medications Prior to Admission:     Armodafinil 250 MG tablet    Ozempic, 2 MG/DOSE, 8 MG/3ML injection pen    TiZANidine (ZANAFLEX) 4 MG capsule    naproxen (EC NAPROSYN) 500 MG EC tablet         Counseling / Coordination of Care  Total floor / unit time spent today 20 minutes.  Greater than 50% of total time was spent with the patient and / or family counseling and / or coordination of care.      ** Please Note: Dragon 360 Dictation voice to text software may have been used in the creation of this document. **

## 2024-05-21 NOTE — ASSESSMENT & PLAN NOTE
43 y/o M w/ PMHx of DM2 and tobacco abuse who presented to the ED w/ a 2-week h/o progressive RLE pain starting in his right calf, initially claudication but has progressed to rest pain.     5/19 CTA- Segmental occlusion (6 cm in length) of the above-knee right popliteal artery. Differential considerations include thromboembolism and in situ thrombosis.     S/P RLE Agram, suction thrombectomy, AK pop PTA 5/20    Plan:  Continue Xarelto  Cardiology; ECHO complete; outpatient Zio patch  Tobacco cessation  Outpatient follow-up in the Vascular Center

## 2024-05-21 NOTE — ASSESSMENT & PLAN NOTE
43 y/o M w/ PMHx of DM2 and tobacco abuse who presented to the ED w/ a 2-week h/o progressive RLE pain starting in his right calf, initially claudication but has progressed to rest pain.     5/19 CTA- Segmental occlusion (6 cm in length) of the above-knee right popliteal artery. Differential considerations include thromboembolism and in situ thrombosis.     S/P RLE Agram, suction thrombectomy, AK pop PTA 5/20    Plan:  Continue Heparin gtt, transition to Xarelto  Obtain CTA chest to r/o embolic source  Cardiology consultation for evaluation to r/o embolic source; ECHO complete; outpatient Zio patch  Tobacco cessation  Possible discharge home today

## 2024-05-21 NOTE — TELEPHONE ENCOUNTER
----- Message from Marta SWIFT sent at 5/21/2024  9:08 AM EDT -----    ----- Message -----  From: Makenzie Acevedo PA-C  Sent: 5/21/2024   9:06 AM EDT  To: Cardiology [facility]    Patient will need a 1 week ZIO monitor for cardioembolic workup.  Please order this under Dr. Gardner.  Patient will need a 6-week follow-up with Dr. Gardner or an AP

## 2024-05-21 NOTE — TELEPHONE ENCOUNTER
----- Message -----  From: Scooby Rodriguez DO  Sent: 5/21/2024   2:29 PM EDT  To: Krish Lemus MD; *    Hello,    Can we please make an outpatient referral to pulmonology for the patient below.  Wanted to pass the message along so it doesn't get lost.    Thank you  ----- Message -----  From: Krish Lemus MD  Sent: 5/21/2024   1:57 PM EDT  To: Scooby Rodriguez DO    Hey,    So this sofia you did the penumbra on yesterday had a CT chest (looking for proximal source for embolus) and the CT chest shows a hilar mass. We forgot to put in a referral to pulm but when he comes to see you on 6/5/24 do you mind making sure he has a referral?    Cole

## 2024-05-21 NOTE — DISCHARGE SUMMARY
[facility]  Discharge- Taj Mendes 1980, 44 y.o. male MRN: [id number]  [facility] Encounter: [id number]  Primary Care Provider: No primary care provider on file.   Date and time admitted to hospital: 5/19/2024  7:30 PM    * Popliteal artery occlusion, right (HCC)  Assessment & Plan   43 y/o M w/ PMHx of DM2 and tobacco abuse who presented to the ED w/ a 2-week h/o progressive RLE pain starting in his right calf, initially claudication but has progressed to rest pain.     5/19 CTA- Segmental occlusion (6 cm in length) of the above-knee right popliteal artery. Differential considerations include thromboembolism and in situ thrombosis.     S/P RLE Agram, suction thrombectomy, AK pop PTA 5/20    Plan:  Continue Xarelto  Cardiology; ECHO complete; outpatient Zio patch  Tobacco cessation  Outpatient follow-up in the Vascular Center          Secondary Diagnosis:  Past Medical History:   Diagnosis Date    Prediabetes      Past Surgical History:   Procedure Laterality Date    IR LOWER EXTREMITY ANGIOGRAM  5/20/2024        Admitting Diagnosis: PAD (peripheral artery disease) ([facility])    Attending: Dr. Lemus    Consultants:   Cardiology    Procedures Performed: S/P RLE Agram, suction thrombectomy, AK pop PTA 5/20    Discharge Medications:  See after visit summary for reconciled discharge medications provided to patient and family.      HPI: Taj Mendes is a 44 y.o. male with PMHXxof DM2 and tobacco abuse who presented to the ED today with c/o a 2-week h/o progressive RLE pain starting in his right calf.  Pain is worse with walking and cramping in nature mostly in posterior calf.  Today patient noticed increased pain at rest with numbness and some burning over his right forefoot prompting him to come to ED for evaluation.  He denies any previous h/o similar symptoms although does note occasional cramping in left calf.  He states his BS have been well  controlled on Ozempic with continuous glucose monitoring.  Patient denies any known h/o vascular or cardiac disease.  No h/o LE wounds or skin color changes. He denies any CP, SOB, GOMES, no fevers or chills, no cough, abdominal pain. He does note a FHx of DM and CAD in his mother.      Patient has 30  year pack history for cigarettes.  No other pertinent PMH.    Hospital Course: Following transfer to [facility] and admission to the vascular surgery service patient was maintained on heparin drip.  He was recommended for arteriogram with suction thrombectomy of popliteal thrombus and AK pop angioplasty which was performed by Dr. Rodriguez on 5/20/2024.  He tolerated surgery well.  Following surgery his symptoms improved.  He was transition from heparin drip to Xarelto therapy.  Xarelto therapy was found to be affordable at $50 per month prior to discharge.  Cardiology was consulted for evaluation to rule out cardioembolic source of embolus.  Echocardiogram was performed which was unremarkable and he is recommended for outpatient loop recorder or Zio patch.  CTA of the chest was performed and did not reveal any direct source of embolism.  He was cleared for discharge home in the care of his family.  He will continue with Xarelto therapy, aspirin and statin therapy.  Outpatient follow-up in the vascular center was arranged prior to his discharge.    Condition at Discharge: stable     Provisions for Follow-Up Care:  See after visit summary for information related to follow-up care and any pertinent home health orders.      Disposition: Home    Discharge instructions/Information to patient and family:   See after visit summary for information provided to patient and family.    Planned Readmission: No    Discharge Statement   I spent 30 minutes discharging the patient. This time was spent on the day of discharge. I had direct contact with the patient on the day of discharge. Additional documentation is required  if more than 30 minutes were spent on discharge.

## 2024-05-21 NOTE — QUICK NOTE
"Post-Op Check - Vascular Surgery   Taj Mendes 44 y.o. male MRN: [id number]  Unit/Bed#: [facility] 523-01 Encounter: [id number]    Assessment:  44yoM s/p aortagram, RLE popliteal penumbra thrombectomy, popliteal DCB, L femoral access ([employer]) in setting of RLE ALI    PLAN:  - Incentive spirometry  - Diet as tolerated  - PRN analgesia  - ASA 325mg/Statin  - hep gtt ACS protocol with no initial bolus, okay for rebolus  - I/Os  - Continue frequent Neurovascular checks/compartment checks    Subjective:  Patient seen and examined with family at bedside, RLE symptoms feeling improved after intervention.     Vitals:  /66 (BP Location: Right arm)   Pulse 61   Temp 98.3 °F (36.8 °C) (Oral)   Resp 18   Ht 6' 2\" (1.88 m)   Wt 112 kg (248 lb)   SpO2 98%   BMI 31.84 kg/m²     Physical Exam:  General appearance: alert and oriented, in no acute distress  Neurologic: Grossly neurologically intact  Neck: supple, symmetrical, trachea midline  Lungs:  Normal work of breathing  Heart:  Regular rate and rhythm  Abdomen:  Soft rounded abdomen. L femoral access with c/d/I dressing in place, soft, without noted hematoma  Extremities:  bilateral LE warm, well-perfused. RLE compartments soft without noted tenderness to palpation or with passive dorsiflexion    Pulse exam:  Femoral: Left: 2+  DP: Right: 2+ Left: 2+  PT: Right: 2+ Left: 2+    I/Os:  I/O last 3 completed shifts:  In: 2102.8 [I.V.:2052.8; IV Piggyback:50]  Out: 2200 [Urine:2200]  I/O this shift:  In: 180 [P.O.:180]  Out: -     Invasive Lines/Tubes:  Invasive Devices       Peripheral Intravenous Line  Duration             Peripheral IV 05/19/24 Right;Ventral (anterior) Forearm 1 day                    VTE Prophylaxis: RX contraindicated due to: hep gtt    Cristy Pascual PA-C  5/20/2024   "

## 2024-05-22 NOTE — UTILIZATION REVIEW
"NOTIFICATION OF INPATIENT ADMISSION   AUTHORIZATION REQUEST   SERVICING FACILITY:   [facility]  Address: [address] [place] [address]  Tax ID: [id number]  NPI: [id number] ATTENDING PROVIDER:  Attending Name and NPI#: Krish Lemus Md [[id number]]  Address: [address] [place] [address]  Phone: [phone]   ADMISSION INFORMATION:  Place of Service: Inpatient [facility]  Place of Service Code: 21  Inpatient Admission Date/Time: 5/19/24  7:30 PM  Discharge Date/Time: 5/21/2024  1:52 PM  Admitting Diagnosis Code/Description:  PAD (peripheral artery disease) ([facility])     UTILIZATION REVIEW CONTACT:  Aarti \"Britt\"Parker Utilization   Network Utilization Review Department  Phone: [phone]  Fax: [phone]  Email: [email or web address]  Contact for approvals/pending authorizations, clinical reviews, and discharge.     PHYSICIAN ADVISORY SERVICES:  Medical Necessity Denial & Ctzi-kq-Fcrh Review  Phone: [phone]  Fax: [phone]  Email: [email or web address]     DISCHARGE SUPPORT TEAM:  For Patients Discharge Needs & Updates  Phone: [phone] opt. 2 Fax: [phone]  Email: [email or web address]     "

## 2024-05-30 ENCOUNTER — TELEPHONE (OUTPATIENT)
Age: 44
End: 2024-05-30

## 2024-06-04 NOTE — PROGRESS NOTES
Assessment/Plan:      There are no diagnoses linked to this encounter.    Popliteal artery occlusion, right (HCC)  44-year-old male presents for hospital follow-up.  He had a right popliteal embolus that was treated with percutaneous mechanical thrombectomy as well as angioplasty of the popliteal artery by myself.  He had a what was likely an embolic event and has had an embolic workup done while he is in the hospital which consisted of a TTE which was negative as well as a CTA chest abdomen pelvis which was also negative for embolic source.  He has been taking Xarelto as well as aspirin and a statin I recommend that he continue all 3 indefinitely he does endorse some cramping pain and some discomfort in the plantar aspect of his foot with walking on exam he has a bounding right dorsalis pedis pulse.  I discussed that the period of ischemia that he experienced before revascularization is likely causing these residual symptoms and that over time this should continue to improve.  I did give him light duty restrictions for his short-term disability and I recommended follow-up with lower extremity arterial duplex in 3 months and an office visit with our practice in 1 year.  He has completely quit smoking since his hospital discharge.      Subjective:     Patient ID: Taj Mendes is a 44 y.o. male.    Patient is s/p R arteriogram with suction thrombectomy of popliteal thrombus and AK pop angioplasty on 5/20/2024 and presents today for post-op visit. R popliteal artery occlusion. Reports R calf tightening/fatigue with walking x 50 yards. Also reports R foot pain and numbness.     HPI    Review of Systems   Constitutional: Negative.    HENT: Negative.     Eyes: Negative.    Respiratory: Negative.     Cardiovascular: Negative.    Gastrointestinal: Negative.    Endocrine: Negative.    Genitourinary: Negative.    Musculoskeletal:         Leg pain  Calf cramping with walking    Skin: Negative.    Allergic/Immunologic:  Negative.    Neurological:  Positive for numbness.   Hematological: Negative.    Psychiatric/Behavioral: Negative.         I have reviewed the ROS above and made changes as needed.      Objective:     Physical Exam      General  Exam: alert, awake, oriented, no distress, consistent with stated age    Integumentary  Exam: warm, dry, no gross lesions, no bruises and normal color    Head and Neck  Exam: supple, no bruits, trachea midline, no JVD, no mass or palpable nodes  Chest and Lung  Exam: chest normal without deformity, bilaterally expansive, clear to auscultation    Cardiovascular  Exam: regular rate, regular rhythm, no murmurs, no rubs or gallops    Adbomen  Exam: soft, non-tender, non-distended, no pulsatile abdominal masses, no abdominal bruit    Peripheral Vascular  Exam: no clubbing of the digits of the upper extremity, no cyanosis, no edema, both feet are warm, radial pulses 2+ bilaterally, skin well perfused, without and no varicosities.    No widened popliteal pulse noted bilaterally    Upper Extremity:  Palpation: Radial pulse- Bilateral 2+    Lower Extremity:  Palpation: Femoral pulse- Bilateral 2+         Popliteal pulse - Bilateral 2+        Dorsalis Pedis - Bilateral 2+        Posterior tibial - Bilateral 2+    Neurologic  Exam:alert, non-focal, oriented x 3, cranial nerves II-XII grossly intact            Tobacco use is a significant patient-modifiable risk factor for this patient’s vascular disease with multiple vascular comorbidities, and a significant risk factor for failure of and complications from any endovascular or surgical interventions.    I explained to the patient the effects of smoking including peripheral artery disease, coronary artery disease, cerebrovascular disease as well as cancer and chronic obstructive pulmonary disease. I asked the patient to stop smoking immediately. It is never too late to quit, and many studies show significant health benefits as well as economical savings  after smoking cessation. I offered to the patient nicotine replacement therapy as well as referral to the smoking cessation program and access to the quit line 2-443-WAWVNVL or ambulatory referral to our network smoking cessation program.    Based on our conversation, this patient does not appear ready to quit    And declined my offer of nicotine replacement or tobacco cessation medications    The patient did not set a quit date. I will continue to  follow up on this issue at our next scheduled visit.     I spent approximately 15 minutes on tobacco cessation counseling with this patient.

## 2024-06-05 ENCOUNTER — OFFICE VISIT (OUTPATIENT)
Dept: VASCULAR SURGERY | Facility: CLINIC | Age: 44
End: 2024-06-05
Payer: COMMERCIAL

## 2024-06-05 VITALS
WEIGHT: 260 LBS | HEART RATE: 84 BPM | DIASTOLIC BLOOD PRESSURE: 92 MMHG | HEIGHT: 74 IN | BODY MASS INDEX: 33.37 KG/M2 | SYSTOLIC BLOOD PRESSURE: 150 MMHG

## 2024-06-05 DIAGNOSIS — I70.201 POPLITEAL ARTERY OCCLUSION, RIGHT (HCC): Primary | ICD-10-CM

## 2024-06-05 DIAGNOSIS — F17.200 SMOKING: ICD-10-CM

## 2024-06-05 PROCEDURE — 99213 OFFICE O/P EST LOW 20 MIN: CPT | Performed by: SURGERY

## 2024-06-05 RX ORDER — ALPRAZOLAM 0.25 MG/1
TABLET ORAL
COMMUNITY

## 2024-06-05 NOTE — ASSESSMENT & PLAN NOTE
44-year-old male presents for hospital follow-up.  He had a right popliteal embolus that was treated with percutaneous mechanical thrombectomy as well as angioplasty of the popliteal artery by myself.  He had a what was likely an embolic event and has had an embolic workup done while he is in the hospital which consisted of a TTE which was negative as well as a CTA chest abdomen pelvis which was also negative for embolic source.  He has been taking Xarelto as well as aspirin and a statin I recommend that he continue all 3 indefinitely he does endorse some cramping pain and some discomfort in the plantar aspect of his foot with walking on exam he has a bounding right dorsalis pedis pulse.  I discussed that the period of ischemia that he experienced before revascularization is likely causing these residual symptoms and that over time this should continue to improve.  I did give him light duty restrictions for his short-term disability and I recommended follow-up with lower extremity arterial duplex in 3 months and an office visit with our practice in 1 year.  He has completely quit smoking since his hospital discharge.

## 2025-06-22 LAB
BASOPHILS # BLD AUTO: 0.04 THOUSANDS/ÂΜL (ref 0–0.1)
BASOPHILS NFR BLD AUTO: 0 % (ref 0–1)
EOSINOPHIL # BLD AUTO: 0.56 THOUSAND/ÂΜL (ref 0–0.61)
EOSINOPHIL NFR BLD AUTO: 4 % (ref 0–6)
ERYTHROCYTE [DISTWIDTH] IN BLOOD BY AUTOMATED COUNT: 13.2 % (ref 11.6–15.1)
HCT VFR BLD AUTO: 51.4 % (ref 36.5–49.3)
HGB BLD-MCNC: 16.9 G/DL (ref 12–17)
IMM GRANULOCYTES # BLD AUTO: 0.1 THOUSAND/UL (ref 0–0.2)
IMM GRANULOCYTES NFR BLD AUTO: 1 % (ref 0–2)
LYMPHOCYTES # BLD AUTO: 1.85 THOUSANDS/ÂΜL (ref 0.6–4.47)
LYMPHOCYTES NFR BLD AUTO: 14 % (ref 14–44)
MCH RBC QN AUTO: 27.5 PG (ref 26.8–34.3)
MCHC RBC AUTO-ENTMCNC: 32.9 G/DL (ref 31.4–37.4)
MCV RBC AUTO: 84 FL (ref 82–98)
MONOCYTES # BLD AUTO: 0.79 THOUSAND/ÂΜL (ref 0.17–1.22)
MONOCYTES NFR BLD AUTO: 6 % (ref 4–12)
NEUTROPHILS # BLD AUTO: 9.82 THOUSANDS/ÂΜL (ref 1.85–7.62)
NEUTS SEG NFR BLD AUTO: 75 % (ref 43–75)
NRBC BLD AUTO-RTO: 0 /100 WBCS
PLATELET # BLD AUTO: 303 THOUSANDS/UL (ref 149–390)
PMV BLD AUTO: 8.7 FL (ref 8.9–12.7)
RBC # BLD AUTO: 6.14 MILLION/UL (ref 3.88–5.62)
WBC # BLD AUTO: 13.16 THOUSAND/UL (ref 4.31–10.16)

## 2025-06-22 PROCEDURE — 85025 COMPLETE CBC W/AUTO DIFF WBC: CPT

## 2025-06-22 PROCEDURE — 36415 COLL VENOUS BLD VENIPUNCTURE: CPT

## 2025-06-22 PROCEDURE — 99284 EMERGENCY DEPT VISIT MOD MDM: CPT

## 2025-06-23 ENCOUNTER — HOSPITAL ENCOUNTER (EMERGENCY)
Facility: HOSPITAL | Age: 45
Discharge: HOME | End: 2025-06-23
Attending: EMERGENCY MEDICINE | Admitting: EMERGENCY MEDICINE
Payer: COMMERCIAL

## 2025-06-23 ENCOUNTER — APPOINTMENT (EMERGENCY)
Dept: CT IMAGING | Facility: HOSPITAL | Age: 45
End: 2025-06-23
Payer: COMMERCIAL

## 2025-06-23 VITALS
DIASTOLIC BLOOD PRESSURE: 84 MMHG | BODY MASS INDEX: 34.01 KG/M2 | WEIGHT: 265 LBS | OXYGEN SATURATION: 100 % | HEART RATE: 86 BPM | TEMPERATURE: 98.6 F | SYSTOLIC BLOOD PRESSURE: 158 MMHG | RESPIRATION RATE: 18 BRPM | HEIGHT: 74 IN

## 2025-06-23 DIAGNOSIS — R19.7 DIARRHEA: ICD-10-CM

## 2025-06-23 DIAGNOSIS — R10.9 ABDOMINAL PAIN: Primary | ICD-10-CM

## 2025-06-23 LAB
ALBUMIN SERPL BCG-MCNC: 5.2 G/DL (ref 3.5–5)
ALP SERPL-CCNC: 89 U/L (ref 34–104)
ALT SERPL W P-5'-P-CCNC: 46 U/L (ref 7–52)
ANION GAP SERPL CALCULATED.3IONS-SCNC: 8 MMOL/L (ref 4–13)
AST SERPL W P-5'-P-CCNC: 27 U/L (ref 13–39)
ATRIAL RATE: 90 BPM
BILIRUB SERPL-MCNC: 0.38 MG/DL (ref 0.2–1)
BUN SERPL-MCNC: 18 MG/DL (ref 5–25)
CALCIUM SERPL-MCNC: 9.7 MG/DL (ref 8.4–10.2)
CHLORIDE SERPL-SCNC: 104 MMOL/L (ref 96–108)
CO2 SERPL-SCNC: 26 MMOL/L (ref 21–32)
CREAT SERPL-MCNC: 1.18 MG/DL (ref 0.6–1.3)
GFR SERPL CREATININE-BSD FRML MDRD: 75 ML/MIN/1.73SQ M
GLUCOSE SERPL-MCNC: 98 MG/DL (ref 65–140)
LIPASE SERPL-CCNC: 158 U/L (ref 11–82)
P AXIS: 29 DEGREES
POTASSIUM SERPL-SCNC: 4.4 MMOL/L (ref 3.5–5.3)
PR INTERVAL: 162 MS
PROT SERPL-MCNC: 8.7 G/DL (ref 6.4–8.4)
QRS AXIS: -49 DEGREES
QRSD INTERVAL: 116 MS
QT INTERVAL: 348 MS
QTC INTERVAL: 425 MS
SODIUM SERPL-SCNC: 138 MMOL/L (ref 135–147)
T WAVE AXIS: 29 DEGREES
VENTRICULAR RATE: 90 BPM

## 2025-06-23 PROCEDURE — 93005 ELECTROCARDIOGRAM TRACING: CPT

## 2025-06-23 PROCEDURE — 99284 EMERGENCY DEPT VISIT MOD MDM: CPT | Performed by: EMERGENCY MEDICINE

## 2025-06-23 PROCEDURE — 74177 CT ABD & PELVIS W/CONTRAST: CPT

## 2025-06-23 PROCEDURE — 36415 COLL VENOUS BLD VENIPUNCTURE: CPT

## 2025-06-23 PROCEDURE — 96374 THER/PROPH/DIAG INJ IV PUSH: CPT

## 2025-06-23 PROCEDURE — 96361 HYDRATE IV INFUSION ADD-ON: CPT

## 2025-06-23 PROCEDURE — 83690 ASSAY OF LIPASE: CPT

## 2025-06-23 PROCEDURE — 80053 COMPREHEN METABOLIC PANEL: CPT

## 2025-06-23 PROCEDURE — 93010 ELECTROCARDIOGRAM REPORT: CPT | Performed by: INTERNAL MEDICINE

## 2025-06-23 RX ORDER — KETOROLAC TROMETHAMINE 30 MG/ML
30 INJECTION, SOLUTION INTRAMUSCULAR; INTRAVENOUS ONCE
Status: COMPLETED | OUTPATIENT
Start: 2025-06-23 | End: 2025-06-23

## 2025-06-23 RX ORDER — LOPERAMIDE HYDROCHLORIDE 2 MG/1
2 CAPSULE ORAL 4 TIMES DAILY PRN
Qty: 12 CAPSULE | Refills: 0 | Status: SHIPPED | OUTPATIENT
Start: 2025-06-23

## 2025-06-23 RX ORDER — LOPERAMIDE HYDROCHLORIDE 2 MG/1
4 CAPSULE ORAL ONCE
Status: COMPLETED | OUTPATIENT
Start: 2025-06-23 | End: 2025-06-23

## 2025-06-23 RX ORDER — DICYCLOMINE HCL 20 MG
20 TABLET ORAL 2 TIMES DAILY
Qty: 20 TABLET | Refills: 0 | Status: SHIPPED | OUTPATIENT
Start: 2025-06-23

## 2025-06-23 RX ORDER — DICYCLOMINE HCL 20 MG
20 TABLET ORAL ONCE
Status: COMPLETED | OUTPATIENT
Start: 2025-06-23 | End: 2025-06-23

## 2025-06-23 RX ADMIN — SODIUM CHLORIDE 1000 ML: 0.9 INJECTION, SOLUTION INTRAVENOUS at 01:07

## 2025-06-23 RX ADMIN — LOPERAMIDE HYDROCHLORIDE 4 MG: 2 CAPSULE ORAL at 00:59

## 2025-06-23 RX ADMIN — KETOROLAC TROMETHAMINE 30 MG: 30 INJECTION, SOLUTION INTRAMUSCULAR; INTRAVENOUS at 01:07

## 2025-06-23 RX ADMIN — DICYCLOMINE HYDROCHLORIDE 20 MG: 20 TABLET ORAL at 00:59

## 2025-06-23 RX ADMIN — IOHEXOL 100 ML: 350 INJECTION, SOLUTION INTRAVENOUS at 01:59

## 2025-06-23 NOTE — ED CARE HANDOFF
Emergency Department Sign Out Note        Sign out and transfer of care from Dr. Marie. See Separate Emergency Department note.     The patient, Taj Mendes, was evaluated by the previous provider for diarrhea.    Workup Completed:  See previous notes    ED Course / Workup Pending (followup):          No data recorded                          ED Course as of 06/23/25 0255   Mon Jun 23, 2025   0150 Pending CT for disposition.    0237 CT report from [facility]    IMPRESSION:  Multiple mildly dilated fluid-filled loops of small bowel which may represent enteritis.  Liquid consistency stool in the colon which can be seen with infectious process such as diarrhea   0237 Well appearing can be discharged home with supportive care.     Procedures  Medical Decision Making  Amount and/or Complexity of Data Reviewed  Radiology: ordered.    Risk  Prescription drug management.            Disposition  Final diagnoses:   Abdominal pain   Diarrhea     Time reflects when diagnosis was documented in both MDM as applicable and the Disposition within this note       Time User Action Codes Description Comment    6/23/2025  2:38 AM Oren Verduzco [R10.9] Abdominal pain     6/23/2025  2:38 AM Oren Verduzco [R19.7] Diarrhea           ED Disposition       ED Disposition   Discharge    Condition   Stable    Date/Time   Mon Jun 23, 2025  2:38 AM    Comment   Taj Mendes discharge to home/self care.                   Follow-up Information       Follow up With Specialties Details Why Contact Info Additional Information    Infolink    [phone]       [facility] [place] [facility]   [facility]  [address]  [facility] [address]  [phone] [facility] [place], [facility], [address], [place]  [address]     [phone]          Discharge Medication List as of 6/23/2025  2:44 AM        START taking these medications     Details   dicyclomine (BENTYL) 20 mg tablet Take 1 tablet (20 mg total) by mouth 2 (two) times a day, Starting Mon 6/23/2025, Normal      loperamide (IMODIUM) 2 mg capsule Take 1 capsule (2 mg total) by mouth 4 (four) times a day as needed for diarrhea, Starting Mon 6/23/2025, Normal           CONTINUE these medications which have NOT CHANGED    Details   acetaminophen (TYLENOL) 325 mg tablet Take 2 tablets (650 mg total) by mouth every 4 (four) hours as needed for mild pain, Starting Tue 5/21/2024, OTC      ALPRAZolam (XANAX) 0.25 mg tablet Take by mouth daily at bedtime as needed for anxiety, Historical Med      Armodafinil 250 MG tablet Take 250 mg by mouth daily as needed In the morning, Starting Fri 5/10/2024, Historical Med      Ozempic, 2 MG/DOSE, 8 MG/3ML injection pen Inject 2 mg under the skin every 7 days, Starting Tue 5/7/2024, Historical Med      rivaroxaban (Xarelto Starter Pack) 15 & 20 MG starter pack Take 1 Package by mouth see administration instructions, Starting Mon 5/20/2024, Normal      TiZANidine (ZANAFLEX) 4 MG capsule Take 4 mg by mouth 3 (three) times a day, Historical Med           STOP taking these medications       aspirin (ECOTRIN LOW STRENGTH) 81 mg EC tablet Comments:   Reason for Stopping:         atorvastatin (LIPITOR) 20 mg tablet Comments:   Reason for Stopping:         nicotine (NICODERM CQ) 21 mg/24 hr TD 24 hr patch Comments:   Reason for Stopping:         rivaroxaban (Xarelto) 20 mg tablet Comments:   Reason for Stopping:             No discharge procedures on file.       ED Provider  Electronically Signed by     Oren Verduzco DO  06/23/25 0256

## 2025-06-23 NOTE — Clinical Note
Taj Mendes was seen and treated in our emergency department on 6/22/2025.                Diagnosis:     Taj  .    He may return on this date: 06/26/2025         If you have any questions or concerns, please don't hesitate to call.      Oren Verduzco, DO    ______________________________           _______________          _______________  Hospital Representative                              Date                                Time

## 2025-06-23 NOTE — ED PROVIDER NOTES
ED Disposition       None          Assessment & Plan       Medical Decision Making  Amount and/or Complexity of Data Reviewed  Radiology: ordered.    Risk  Prescription drug management.        ED Course as of 06/23/25 0202   Mon Jun 23, 2025   0028 Pt seen and examined.  Patient is a 45-year-old male who began with explosive diarrhea on Friday which has persisted, has upset stomach with abdominal cramping.  Denies nausea but has loss of appetite, no vomiting.  Will give IV fluids, Bentyl and Toradol as well as a dose of Imodium and CT abdomen pelvis.   0111 EKG - NSR 90, LVH, LAFB, no acute ischemia.   0149 Pt signed out to Dr Verduzco - if CT a/p NEG for acute findings, will d/c home with continued supportive care.  BP down now that pt more comfortable.        Medications   sodium chloride 0.9 % bolus 1,000 mL (1,000 mL Intravenous New Bag 6/23/25 0107)   ketorolac (TORADOL) injection 30 mg (30 mg Intravenous Given 6/23/25 0107)   dicyclomine (BENTYL) tablet 20 mg (20 mg Oral Given 6/23/25 0059)   loperamide (IMODIUM) capsule 4 mg (4 mg Oral Given 6/23/25 0059)   iohexol (OMNIPAQUE) 350 MG/ML injection (MULTI-DOSE) 100 mL (100 mL Intravenous Given 6/23/25 0159)       ED Risk Strat Scores                    No data recorded        SBIRT 20yo+      Flowsheet Row Most Recent Value   Initial Alcohol Screen: US AUDIT-C     1. How often do you have a drink containing alcohol? 0 Filed at: 06/22/2025 2320   2. How many drinks containing alcohol do you have on a typical day you are drinking?  0 Filed at: 06/22/2025 2320   3a. Male UNDER 65: How often do you have five or more drinks on one occasion? 0 Filed at: 06/22/2025 2320   Audit-C Score 0 Filed at: 06/22/2025 2320   TRUDY: How many times in the past year have you...    Used an illegal drug or used a prescription medication for non-medical reasons? Never Filed at: 06/22/2025 2320                            History of Present Illness       Chief Complaint   Patient  presents with    Diarrhea     PT with several days of diarrhea.       Past Medical History[1]   Past Surgical History[2]   Family History[3]   Social History[4]   E-Cigarette/Vaping    E-Cigarette Use Former User       E-Cigarette/Vaping Substances      I have reviewed and agree with the history as documented.     45-year-old male who began with explosive diarrhea on Friday which has persisted, has upset stomach with abdominal cramping.  Denies nausea but has loss of appetite, no vomiting.       History provided by:  Patient and spouse   used: No    Diarrhea  Quality:  Watery  Severity:  Moderate  Onset quality:  Sudden  Duration:  3 days  Timing:  Constant  Progression:  Unchanged  Relieved by:  Nothing  Worsened by:  Nothing  Ineffective treatments:  None tried  Associated symptoms: abdominal pain (cramping, upset stomach)    Associated symptoms: no arthralgias, no chills, no fever, no headaches, no myalgias and no vomiting    Associated symptoms comment:  Loss of appetite  Risk factors: no recent antibiotic use, no sick contacts, no suspicious food intake and no travel to endemic areas        Review of Systems   Constitutional:  Positive for appetite change. Negative for chills and fever.   HENT:  Negative for ear pain and sore throat.    Eyes:  Negative for pain and visual disturbance.   Respiratory:  Negative for cough and shortness of breath.    Cardiovascular:  Negative for chest pain and palpitations.   Gastrointestinal:  Positive for abdominal pain (cramping, upset stomach) and diarrhea. Negative for constipation, nausea and vomiting.   Genitourinary:  Negative for dysuria and hematuria.   Musculoskeletal:  Negative for arthralgias, back pain and myalgias.   Skin:  Negative for color change and rash.   Neurological:  Positive for numbness (front half right foot for months - no recent changes). Negative for seizures, syncope and headaches.   All other systems reviewed and are  negative.          Objective       ED Triage Vitals   Temperature Pulse Blood Pressure Respirations SpO2 Patient Position - Orthostatic VS   06/23/25 0114 06/22/25 2319 06/22/25 2319 06/22/25 2319 06/22/25 2319 06/22/25 2319   98.6 °F (37 °C) 103 (!) 200/102 20 99 % Sitting      Temp Source Heart Rate Source BP Location FiO2 (%) Pain Score    06/23/25 0114 06/23/25 0114 06/22/25 2319 -- 06/22/25 2319    Oral Monitor Left arm  6      Vitals      Date and Time Temp Pulse SpO2 Resp BP Pain Score FACES Pain Rating User   06/23/25 0114 98.6 °F (37 °C) 91 100 % 18 149/83 -- -- CA   06/23/25 0107 -- -- -- -- -- 6 -- CA   06/22/25 2319 -- 103 99 % 20 200/102 6 -- WM            Physical Exam  Vitals and nursing note reviewed.   Constitutional:       General: He is not in acute distress.     Appearance: He is well-developed. He is not ill-appearing.   HENT:      Head: Normocephalic and atraumatic.      Mouth/Throat:      Mouth: Mucous membranes are moist.     Eyes:      Extraocular Movements: Extraocular movements intact.      Conjunctiva/sclera: Conjunctivae normal.       Cardiovascular:      Rate and Rhythm: Normal rate and regular rhythm.      Heart sounds: No murmur heard.  Pulmonary:      Effort: Pulmonary effort is normal. No respiratory distress.      Breath sounds: Normal breath sounds.   Abdominal:      General: There is no distension.      Palpations: Abdomen is soft.      Tenderness: There is abdominal tenderness (mild diffuse). There is no guarding or rebound.      Hernia: No hernia is present.      Comments: Hyperactive BS     Musculoskeletal:         General: No swelling.      Cervical back: Neck supple.     Skin:     General: Skin is warm and dry.      Capillary Refill: Capillary refill takes less than 2 seconds.      Coloration: Skin is not pale.     Neurological:      Mental Status: He is alert and oriented to person, place, and time. Mental status is at baseline.     Psychiatric:         Mood and Affect:  Mood normal.         Results Reviewed       Procedure Component Value Units Date/Time    Comprehensive metabolic panel [[id number]]  (Abnormal) Collected: 06/23/25 0105    Lab Status: Final result Specimen: Blood from Arm, Right Updated: 06/23/25 0134     Sodium 138 mmol/L      Potassium 4.4 mmol/L      Chloride 104 mmol/L      CO2 26 mmol/L      ANION GAP 8 mmol/L      BUN 18 mg/dL      Creatinine 1.18 mg/dL      Glucose 98 mg/dL      Calcium 9.7 mg/dL      AST 27 U/L      ALT 46 U/L      Alkaline Phosphatase 89 U/L      Total Protein 8.7 g/dL      Albumin 5.2 g/dL      Total Bilirubin 0.38 mg/dL      eGFR 75 ml/min/1.73sq m     Narrative:      National Kidney Disease Foundation guidelines for Chronic Kidney Disease (CKD):     Stage 1 with normal or high GFR (GFR > 90 mL/min/1.73 square meters)    Stage 2 Mild CKD (GFR = 60-89 mL/min/1.73 square meters)    Stage 3A Moderate CKD (GFR = 45-59 mL/min/1.73 square meters)    Stage 3B Moderate CKD (GFR = 30-44 mL/min/1.73 square meters)    Stage 4 Severe CKD (GFR = 15-29 mL/min/1.73 square meters)    Stage 5 End Stage CKD (GFR <15 mL/min/1.73 square meters)  Note: GFR calculation is accurate only with a steady state creatinine    Lipase [[id number]]  (Abnormal) Collected: 06/23/25 0105    Lab Status: Final result Specimen: Blood from Arm, Right Updated: 06/23/25 0134     Lipase 158 u/L     CBC and differential [[id number]]  (Abnormal) Collected: 06/22/25 2325    Lab Status: Final result Specimen: Blood Updated: 06/22/25 2333     WBC 13.16 Thousand/uL      RBC 6.14 Million/uL      Hemoglobin 16.9 g/dL      Hematocrit 51.4 %      MCV 84 fL      MCH 27.5 pg      MCHC 32.9 g/dL      RDW 13.2 %      MPV 8.7 fL      Platelets 303 Thousands/uL      nRBC 0 /100 WBCs      Segmented % 75 %      Immature Grans % 1 %      Lymphocytes % 14 %      Monocytes % 6 %      Eosinophils Relative 4 %      Basophils Relative 0 %      Absolute Neutrophils 9.82 Thousands/µL      Absolute Immature  Grans 0.10 Thousand/uL      Absolute Lymphocytes 1.85 Thousands/µL      Absolute Monocytes 0.79 Thousand/µL      Eosinophils Absolute 0.56 Thousand/µL      Basophils Absolute 0.04 Thousands/µL             CT abdomen pelvis with contrast    (Results Pending)       Procedures    ED Medication and Procedure Management   Prior to Admission Medications   Prescriptions Last Dose Informant Patient Reported? Taking?   ALPRAZolam (XANAX) 0.25 mg tablet  Self Yes No   Sig: Take by mouth daily at bedtime as needed for anxiety   Armodafinil 250 MG tablet  Self Yes No   Sig: Take 250 mg by mouth daily as needed In the morning   Ozempic, 2 MG/DOSE, 8 MG/3ML injection pen  Self Yes No   Sig: Inject 2 mg under the skin every 7 days   TiZANidine (ZANAFLEX) 4 MG capsule  Self Yes No   Sig: Take 4 mg by mouth 3 (three) times a day   acetaminophen (TYLENOL) 325 mg tablet  Self No No   Sig: Take 2 tablets (650 mg total) by mouth every 4 (four) hours as needed for mild pain   aspirin (ECOTRIN LOW STRENGTH) 81 mg EC tablet  Self No No   Sig: Take 1 tablet (81 mg total) by mouth daily   Patient not taking: Reported on 6/5/2024   atorvastatin (LIPITOR) 20 mg tablet  Self No No   Sig: Take 1 tablet (20 mg total) by mouth daily with dinner   Patient not taking: Reported on 6/5/2024   nicotine (NICODERM CQ) 21 mg/24 hr TD 24 hr patch  Self No No   Sig: Place 1 patch on the skin over 24 hours daily   Patient not taking: Reported on 6/5/2024   rivaroxaban (Xarelto Starter Pack) 15 & 20 MG starter pack  Self No No   Sig: Take 1 Package by mouth see administration instructions   rivaroxaban (Xarelto) 20 mg tablet  Self No No   Sig: Take 1 tablet (20 mg total) by mouth daily with breakfast Do not start before June 17, 2024.   Patient not taking: Reported on 6/5/2024 Do not start before June 17, 2024.      Facility-Administered Medications: None     Patient's Medications   Discharge Prescriptions    No medications on file     No discharge procedures  on file.  ED SEPSIS DOCUMENTATION              [1]   Past Medical History:  Diagnosis Date    Prediabetes    [2]   Past Surgical History:  Procedure Laterality Date    ARTERIOGRAM Right 5/20/2024    Procedure: ARTERIOGRAM; Ariadna Drake thrombectomy;  Surgeon: Scooby Rodriguez DO;  Location: [facility];  Service: Vascular    IR LOWER EXTREMITY ANGIOGRAM  5/20/2024   [3] No family history on file.  [4]   Social History  Tobacco Use    Smoking status: Every Day     Current packs/day: 1.00     Types: Cigarettes    Smokeless tobacco: Never   Vaping Use    Vaping status: Former   Substance Use Topics    Alcohol use: Never    Drug use: Never        Monika Marie DO  06/23/25 0202

## (undated) DEVICE — Device: Brand: GRAND SLAM

## (undated) DEVICE — ELECTRODE BLADE MOD E-Z CLEAN 2.5IN 6.4CM -0012M

## (undated) DEVICE — RADIFOCUS TORQUE DEVICE MULTI-TORQUE VISE: Brand: RADIFOCUS TORQUE DEVICE

## (undated) DEVICE — PROBE COVER: Brand: STERILE PROBE COVER

## (undated) DEVICE — 3M™ TEGADERM™ TRANSPARENT FILM DRESSING FRAME STYLE, 1627, 4 IN X 10 IN (10 CM X 25 CM), 20/CT 4CT/CASE: Brand: 3M™ TEGADERM™

## (undated) DEVICE — RADIFOCUS GLIDEWIRE: Brand: GLIDEWIRE

## (undated) DEVICE — GAUZE SPONGES,16 PLY: Brand: CURITY

## (undated) DEVICE — GLOVE SRG BIOGEL ECLIPSE 7.5

## (undated) DEVICE — PRESTO™ INFLATION DEVICE: Brand: PRESTO

## (undated) DEVICE — DRAPE SHEET X-LG

## (undated) DEVICE — PINNACLE R/O II INTRODUCER SHEATH WITH RADIOPAQUE MARKER: Brand: PINNACLE

## (undated) DEVICE — NON-DEHP HIGH FLOW RATE EXTENSION SET, MALE LUER LOCK ADAPTER

## (undated) DEVICE — 3M™ IOBAN™ 2 ANTIMICROBIAL INCISE DRAPE 6650EZ: Brand: IOBAN™ 2

## (undated) DEVICE — MICROPUNCTURE 501

## (undated) DEVICE — STERILE ICS CARDIOVASCULAR PK: Brand: CARDINAL HEALTH

## (undated) DEVICE — DESTINATION PERIPHERAL GUIDING SHEATH: Brand: DESTINATION

## (undated) DEVICE — CATH DIAG 5FR 0.035IN 65CM BS RBI SIDE PORTS

## (undated) DEVICE — FLUID MANAGEMENT KIT - IR

## (undated) DEVICE — Device

## (undated) DEVICE — EXOFIN PRECISION PEN HIGH VISCOSITY TOPICAL SKIN ADHESIVE: Brand: EXOFIN PRECISION PEN, 1G

## (undated) DEVICE — PACLITAXEL-COATED PTA BALLOON CATHETER: Brand: RANGER™